# Patient Record
Sex: MALE | Race: WHITE | Employment: PART TIME | URBAN - METROPOLITAN AREA
[De-identification: names, ages, dates, MRNs, and addresses within clinical notes are randomized per-mention and may not be internally consistent; named-entity substitution may affect disease eponyms.]

---

## 2017-03-15 ENCOUNTER — APPOINTMENT (OUTPATIENT)
Dept: LAB | Age: 47
End: 2017-03-15
Attending: INTERNAL MEDICINE
Payer: COMMERCIAL

## 2017-03-15 DIAGNOSIS — R73.03 PREDIABETES: ICD-10-CM

## 2017-03-15 LAB — HBA1C MFR BLD: 6.5 % (ref 4–6)

## 2017-03-15 PROCEDURE — 83036 HEMOGLOBIN GLYCOSYLATED A1C: CPT

## 2017-03-15 PROCEDURE — 36415 COLL VENOUS BLD VENIPUNCTURE: CPT

## 2017-04-29 ENCOUNTER — OFFICE VISIT (OUTPATIENT)
Dept: INTERNAL MEDICINE CLINIC | Facility: CLINIC | Age: 47
End: 2017-04-29

## 2017-04-29 VITALS
SYSTOLIC BLOOD PRESSURE: 154 MMHG | HEART RATE: 78 BPM | RESPIRATION RATE: 18 BRPM | WEIGHT: 228.38 LBS | DIASTOLIC BLOOD PRESSURE: 84 MMHG | HEIGHT: 68 IN | BODY MASS INDEX: 34.61 KG/M2

## 2017-04-29 DIAGNOSIS — Z13.6 ISCHEMIC HEART DISEASE SCREEN: ICD-10-CM

## 2017-04-29 DIAGNOSIS — F98.8 ADD (ATTENTION DEFICIT DISORDER): ICD-10-CM

## 2017-04-29 DIAGNOSIS — R07.2 PRECORDIAL PAIN: Primary | ICD-10-CM

## 2017-04-29 DIAGNOSIS — R03.0 ELEVATED BLOOD PRESSURE READING: ICD-10-CM

## 2017-04-29 DIAGNOSIS — R73.03 PREDIABETES: ICD-10-CM

## 2017-04-29 PROCEDURE — 99212 OFFICE O/P EST SF 10 MIN: CPT | Performed by: INTERNAL MEDICINE

## 2017-04-29 PROCEDURE — 99214 OFFICE O/P EST MOD 30 MIN: CPT | Performed by: INTERNAL MEDICINE

## 2017-04-29 RX ORDER — METFORMIN HYDROCHLORIDE 500 MG/1
500 TABLET, EXTENDED RELEASE ORAL
Qty: 30 TABLET | Refills: 5 | Status: SHIPPED | OUTPATIENT
Start: 2017-04-29 | End: 2017-12-19

## 2017-04-29 RX ORDER — DEXMETHYLPHENIDATE HYDROCHLORIDE 30 MG/1
CAPSULE, EXTENDED RELEASE ORAL
Refills: 0 | COMMUNITY
Start: 2017-04-18

## 2017-04-29 NOTE — PROGRESS NOTES
HPI:    Patient ID: Gustavo Delgado is a 56year old male. HPI Comments: Pt is taking Focalin for ADD. His weight is up, his blood pressure is up and his blood sugar is up. He had chest pain. It lasted 2 minutes.       Blood Sugar  This is a chronic p TREADMILL STRESS, ADULT (CPT=93017); Future    2. Prediabetes  Reviewed recent labs with pt. Discussed with pt the diagnosis of prediabetes. Discussed possible progression to diabetes with accompanying complications.   Advised pt importance of weight loss

## 2017-05-22 ENCOUNTER — HOSPITAL ENCOUNTER (OUTPATIENT)
Dept: CV DIAGNOSTICS | Facility: HOSPITAL | Age: 47
Discharge: HOME | End: 2017-05-22
Attending: INTERNAL MEDICINE
Payer: COMMERCIAL

## 2017-05-22 DIAGNOSIS — R07.2 PRECORDIAL PAIN: ICD-10-CM

## 2017-05-22 PROCEDURE — 93018 CV STRESS TEST I&R ONLY: CPT | Performed by: INTERNAL MEDICINE

## 2017-05-22 PROCEDURE — 93017 CV STRESS TEST TRACING ONLY: CPT | Performed by: INTERNAL MEDICINE

## 2017-05-22 PROCEDURE — 93016 CV STRESS TEST SUPVJ ONLY: CPT | Performed by: INTERNAL MEDICINE

## 2017-12-12 ENCOUNTER — HOSPITAL ENCOUNTER (EMERGENCY)
Facility: HOSPITAL | Age: 47
Discharge: HOME | End: 2017-12-12
Attending: EMERGENCY MEDICINE
Payer: COMMERCIAL

## 2017-12-12 ENCOUNTER — HOSPITAL ENCOUNTER (OUTPATIENT)
Age: 47
Discharge: ANOTHER HOSPITAL | End: 2017-12-12
Attending: FAMILY MEDICINE
Payer: COMMERCIAL

## 2017-12-12 ENCOUNTER — APPOINTMENT (OUTPATIENT)
Dept: CT IMAGING | Facility: HOSPITAL | Age: 47
End: 2017-12-12
Attending: EMERGENCY MEDICINE
Payer: COMMERCIAL

## 2017-12-12 VITALS
SYSTOLIC BLOOD PRESSURE: 151 MMHG | HEART RATE: 68 BPM | RESPIRATION RATE: 16 BRPM | BODY MASS INDEX: 33.65 KG/M2 | WEIGHT: 222 LBS | OXYGEN SATURATION: 98 % | TEMPERATURE: 98 F | HEIGHT: 68 IN | DIASTOLIC BLOOD PRESSURE: 91 MMHG

## 2017-12-12 VITALS
DIASTOLIC BLOOD PRESSURE: 107 MMHG | TEMPERATURE: 98 F | RESPIRATION RATE: 16 BRPM | HEART RATE: 83 BPM | OXYGEN SATURATION: 100 % | SYSTOLIC BLOOD PRESSURE: 166 MMHG

## 2017-12-12 DIAGNOSIS — J01.90 ACUTE SINUSITIS, RECURRENCE NOT SPECIFIED, UNSPECIFIED LOCATION: Primary | ICD-10-CM

## 2017-12-12 DIAGNOSIS — R42 DIZZY: ICD-10-CM

## 2017-12-12 DIAGNOSIS — R10.9 FLANK PAIN: ICD-10-CM

## 2017-12-12 DIAGNOSIS — I10 HYPERTENSION, UNSPECIFIED TYPE: Primary | ICD-10-CM

## 2017-12-12 PROCEDURE — 81003 URINALYSIS AUTO W/O SCOPE: CPT | Performed by: EMERGENCY MEDICINE

## 2017-12-12 PROCEDURE — 83835 ASSAY OF METANEPHRINES: CPT | Performed by: EMERGENCY MEDICINE

## 2017-12-12 PROCEDURE — 99214 OFFICE O/P EST MOD 30 MIN: CPT

## 2017-12-12 PROCEDURE — 70450 CT HEAD/BRAIN W/O DYE: CPT | Performed by: EMERGENCY MEDICINE

## 2017-12-12 PROCEDURE — 82384 ASSAY THREE CATECHOLAMINES: CPT | Performed by: EMERGENCY MEDICINE

## 2017-12-12 PROCEDURE — 84443 ASSAY THYROID STIM HORMONE: CPT | Performed by: EMERGENCY MEDICINE

## 2017-12-12 PROCEDURE — 84484 ASSAY OF TROPONIN QUANT: CPT | Performed by: EMERGENCY MEDICINE

## 2017-12-12 PROCEDURE — 80048 BASIC METABOLIC PNL TOTAL CA: CPT | Performed by: EMERGENCY MEDICINE

## 2017-12-12 PROCEDURE — 93005 ELECTROCARDIOGRAM TRACING: CPT

## 2017-12-12 PROCEDURE — 85025 COMPLETE CBC W/AUTO DIFF WBC: CPT | Performed by: EMERGENCY MEDICINE

## 2017-12-12 PROCEDURE — 99285 EMERGENCY DEPT VISIT HI MDM: CPT

## 2017-12-12 PROCEDURE — 36415 COLL VENOUS BLD VENIPUNCTURE: CPT

## 2017-12-12 PROCEDURE — 93010 ELECTROCARDIOGRAM REPORT: CPT | Performed by: FAMILY MEDICINE

## 2017-12-12 RX ORDER — AMOXICILLIN AND CLAVULANATE POTASSIUM 875; 125 MG/1; MG/1
1 TABLET, FILM COATED ORAL 2 TIMES DAILY
Qty: 20 TABLET | Refills: 0 | Status: SHIPPED | OUTPATIENT
Start: 2017-12-12 | End: 2017-12-22

## 2017-12-12 RX ORDER — BUSPIRONE HYDROCHLORIDE 30 MG/1
45 TABLET ORAL 2 TIMES DAILY
Refills: 0 | COMMUNITY
Start: 2017-12-06

## 2017-12-12 NOTE — ED PROVIDER NOTES
Patient Seen in: [address]    History   Patient presents with:  Hypertension (cardiovascular)    Stated Complaint: High Blood pressure, dizzy    HPI  69-year-old male with PMHx significant for DM2 presents with elevated Temp 98.4 °F (36.9 °C) (Oral)   Resp 16   SpO2 100%         Physical Exam   Constitutional: He is oriented to person, place, and time. He appears well-developed and well-nourished. No distress.    Eyes: Conjunctivae and EOM are normal. Pupils are equal, r

## 2017-12-12 NOTE — ED INITIAL ASSESSMENT (HPI)
Pt here with complaints of high blood pressure and headaches for the past 6 days, pt states his blood pressure consistently 170/80 180/80's pt states he has been dizzy as well  He said he has never felt like this before, pt also states he has had some disc

## 2017-12-13 NOTE — ED PROVIDER NOTES
Patient Seen in: [facility] Emergency Department    History   Patient presents with:  Hypertension (cardiovascular)    Stated Complaint: dizziness/Hypertension      HPI    55 yo M with PMH ADHD, anxiety presenting for evaluation of one week of inter systems are as noted in HPI. Constitutional and vital signs reviewed. All other systems reviewed and negative except as noted above. PSFH elements reviewed from today and agreed except as otherwise stated in HPI.     Physical Exam   ED Triage Vital ------                     CBC W/ DIFFERENTIAL[[id number]]          Abnormal            Final result                 Please view results for these tests on the individual orders.    METANEPHRINES, FRAC., PL. FREE   METANEPHRINES, URINE, TOTAL   CATECHO maxillary and sphenoid sinus infection. This may present as acute headache. Correlate clinically. 2. Otherwise, negative noncontrast CT brain study.          MDM     DIFFERENTIAL DIAGNOSIS: After history and physical exam differential diagnosis was consider

## 2017-12-13 NOTE — ED INITIAL ASSESSMENT (HPI)
Pt states hypertension since 5 days with \"kidney pressure\" - states his pressure has been 170's over 90's. + lightheadedness +HA x 5 days intermittently, worsening today. Sent from [address] [place] [address].

## 2017-12-15 ENCOUNTER — TELEPHONE (OUTPATIENT)
Dept: INTERNAL MEDICINE CLINIC | Facility: CLINIC | Age: 47
End: 2017-12-15

## 2017-12-15 NOTE — TELEPHONE ENCOUNTER
Pt was in the ER 12/12 and needs follow up appt. First available is not until later in January. Can we double book with another appointment to get the patient in?     Please reply to pool: JARRELL Singleton

## 2017-12-15 NOTE — TELEPHONE ENCOUNTER
I can see him Monday at [place] at 3 pm or Tuesday at [address] [facility], [address] at 8:50 am.  If those don't work for him, he can see the ROD Dubon or one of the other doctors.

## 2017-12-19 ENCOUNTER — APPOINTMENT (OUTPATIENT)
Dept: LAB | Facility: HOSPITAL | Age: 47
End: 2017-12-19
Attending: INTERNAL MEDICINE
Payer: COMMERCIAL

## 2017-12-19 DIAGNOSIS — R73.03 PREDIABETES: ICD-10-CM

## 2017-12-19 PROBLEM — R03.0 TRANSIENT ELEVATED BLOOD PRESSURE: Status: ACTIVE | Noted: 2017-12-19

## 2017-12-19 PROCEDURE — 83036 HEMOGLOBIN GLYCOSYLATED A1C: CPT

## 2017-12-19 PROCEDURE — 36415 COLL VENOUS BLD VENIPUNCTURE: CPT

## 2017-12-19 NOTE — PROGRESS NOTES
HPI:    Patient ID: Satinder Gómez is a 56year old male. Pt started on Buspar a month ago or so and had side effects. Then he had lightheadedness and dizziness, nausea. His bp at home was 160/90 at home every afternoon.   They did urinary metanephrin Smokeless tobacco: Never Used                      Alcohol use:  No               Comment: socially     Family History   Problem Relation Age of Onset   • Diabetes Father    • Diabetes Mother    • Hypertension Mother    • Heart Disorder Mother    • Carroll Velasco Metoprolol Succinate ER 50 MG Oral Tablet 24 Hr    Obstructive sleep apnea on CPAP     Controlled. Continue present management. ADD (attention deficit disorder) - Primary     As above. Advised pt to discuss with Dr. Sonia Miller.          Prediabet

## 2017-12-19 NOTE — ASSESSMENT & PLAN NOTE
Patient is on an amphetamine for his ADD. We have had a long discussion today and in the past about the risks of taking this with his multiple cardiovascular risk factors, including prediabetes, ORALIA, obesity, and now elevated blood pressure.   Today we dis

## 2017-12-21 ENCOUNTER — TELEPHONE (OUTPATIENT)
Dept: INTERNAL MEDICINE CLINIC | Facility: CLINIC | Age: 47
End: 2017-12-21

## 2017-12-21 NOTE — TELEPHONE ENCOUNTER
Called pt. Left detailed message. I would like for him to sign a IVAN so I can talk to Dr. Marielle Jeffrey regarding his medication.

## 2018-01-16 PROBLEM — K63.5 POLYP OF COLON: Status: ACTIVE | Noted: 2018-01-16

## 2018-01-17 ENCOUNTER — OFFICE VISIT (OUTPATIENT)
Dept: FAMILY MEDICINE CLINIC | Facility: CLINIC | Age: 48
End: 2018-01-17

## 2018-01-17 VITALS
DIASTOLIC BLOOD PRESSURE: 80 MMHG | HEART RATE: 68 BPM | SYSTOLIC BLOOD PRESSURE: 128 MMHG | WEIGHT: 234 LBS | BODY MASS INDEX: 35.46 KG/M2 | HEIGHT: 68 IN | OXYGEN SATURATION: 98 %

## 2018-01-17 DIAGNOSIS — I10 HYPERTENSION WITH GOAL BLOOD PRESSURE LESS THAN 140/90: Primary | ICD-10-CM

## 2018-01-17 DIAGNOSIS — F98.8 ATTENTION DEFICIT DISORDER, UNSPECIFIED HYPERACTIVITY PRESENCE: ICD-10-CM

## 2018-01-17 DIAGNOSIS — Z23 NEED FOR VACCINATION: ICD-10-CM

## 2018-01-17 DIAGNOSIS — R73.03 PREDIABETES: ICD-10-CM

## 2018-01-17 DIAGNOSIS — R79.89 ELEVATED PLASMA METANEPHRINES: ICD-10-CM

## 2018-01-17 PROBLEM — R03.0 TRANSIENT ELEVATED BLOOD PRESSURE: Status: RESOLVED | Noted: 2017-12-19 | Resolved: 2018-01-17

## 2018-01-17 PROCEDURE — 90471 IMMUNIZATION ADMIN: CPT | Performed by: FAMILY MEDICINE

## 2018-01-17 PROCEDURE — 90686 IIV4 VACC NO PRSV 0.5 ML IM: CPT | Performed by: FAMILY MEDICINE

## 2018-01-17 PROCEDURE — 99203 OFFICE O/P NEW LOW 30 MIN: CPT | Performed by: FAMILY MEDICINE

## 2018-01-17 RX ORDER — METFORMIN HYDROCHLORIDE 500 MG/1
500 TABLET, EXTENDED RELEASE ORAL 2 TIMES DAILY WITH MEALS
Qty: 30 TABLET | Refills: 0 | COMMUNITY
Start: 2018-01-17 | End: 2018-03-08

## 2018-01-17 NOTE — PROGRESS NOTES
CC:  Patient presents with:  Establish Care      HPI: 56year old male with history of pre-diabetes, ADHD, recent HTN, and ORALIA here to establish care. Previous PCP was Dr. Seda Zhou, saw her for a few years.   Went to ED in December with dizziness and high b ADHD and anxiety     Past Medical History:   Diagnosis Date   • ADHD (attention deficit hyperactivity disorder)    • Borderline diabetes    • Borderline hypertension    • Hemorrhoid    • Sleep apnea     Wears CPAP         Social History  Social History   M continues to take Focalin and reports no significant changes to BP when he is off medication  - Plan to repeat 24 hour urinary metanephrine testing after he has been off Focalin for 72 hours to determine if the test was falsely impacted by medication   - A

## 2018-01-27 ENCOUNTER — APPOINTMENT (OUTPATIENT)
Dept: LAB | Facility: REFERENCE LAB | Age: 48
End: 2018-01-27
Attending: FAMILY MEDICINE
Payer: COMMERCIAL

## 2018-01-27 DIAGNOSIS — R79.89 ELEVATED PLASMA METANEPHRINES: ICD-10-CM

## 2018-01-27 PROCEDURE — 83835 ASSAY OF METANEPHRINES: CPT

## 2018-01-31 LAB
CREATININE, URINE - PER 24H: 1138 MG/D
CREATININE, URINE - PER VOLUME: 99 MG/DL
HOURS COLLECTED: 24 HR
METANEPHRINE, UR- RATIO TO CRT: 53 UG/G CRT
METANEPHRINE, URINE - PER 24H: 60 UG/D
METANEPHRINE, URINE-PER VOLUME: 52 UG/L
NORMETANEPHRINE, UR-PER VOLUME: 218 UG/L
NORMETANEPHRINE, URINE - RATIO: 220 UG/G CRT
NORMETANEPHRINE, URINE-PER 24H: 251 UG/D
TOTAL VOLUME: 1150 ML

## 2018-02-15 ENCOUNTER — MED REC SCAN ONLY (OUTPATIENT)
Dept: INTERNAL MEDICINE CLINIC | Facility: CLINIC | Age: 48
End: 2018-02-15

## 2018-03-08 ENCOUNTER — APPOINTMENT (OUTPATIENT)
Dept: LAB | Facility: REFERENCE LAB | Age: 48
End: 2018-03-08
Attending: FAMILY MEDICINE
Payer: COMMERCIAL

## 2018-03-08 ENCOUNTER — OFFICE VISIT (OUTPATIENT)
Dept: FAMILY MEDICINE CLINIC | Facility: CLINIC | Age: 48
End: 2018-03-08

## 2018-03-08 VITALS
DIASTOLIC BLOOD PRESSURE: 76 MMHG | HEART RATE: 72 BPM | SYSTOLIC BLOOD PRESSURE: 124 MMHG | WEIGHT: 228 LBS | OXYGEN SATURATION: 98 % | HEIGHT: 68 IN | BODY MASS INDEX: 34.56 KG/M2

## 2018-03-08 DIAGNOSIS — R73.03 PREDIABETES: ICD-10-CM

## 2018-03-08 DIAGNOSIS — I10 HYPERTENSION WITH GOAL BLOOD PRESSURE LESS THAN 140/90: ICD-10-CM

## 2018-03-08 DIAGNOSIS — R39.15 URGENCY OF URINATION: ICD-10-CM

## 2018-03-08 DIAGNOSIS — Z00.01 ENCOUNTER FOR ROUTINE ADULT HEALTH EXAMINATION WITH ABNORMAL FINDINGS: Primary | ICD-10-CM

## 2018-03-08 DIAGNOSIS — K59.01 SLOW TRANSIT CONSTIPATION: ICD-10-CM

## 2018-03-08 PROBLEM — E11.9 TYPE 2 DIABETES MELLITUS WITHOUT COMPLICATION (HCC): Status: ACTIVE | Noted: 2017-12-19

## 2018-03-08 PROBLEM — R79.89 ELEVATED PLASMA METANEPHRINES: Status: RESOLVED | Noted: 2018-01-17 | Resolved: 2018-03-08

## 2018-03-08 LAB
ALBUMIN SERPL BCP-MCNC: 4.2 G/DL (ref 3.5–4.8)
ALBUMIN/GLOB SERPL: 1.2 {RATIO} (ref 1–2)
ALP SERPL-CCNC: 54 U/L (ref 32–100)
ALT SERPL-CCNC: 23 U/L (ref 17–63)
ANION GAP SERPL CALC-SCNC: 7 MMOL/L (ref 0–18)
AST SERPL-CCNC: 22 U/L (ref 15–41)
BILIRUB SERPL-MCNC: 0.5 MG/DL (ref 0.3–1.2)
BILIRUBIN URINE: NEGATIVE
BLOOD URINE: NEGATIVE
BUN SERPL-MCNC: 14 MG/DL (ref 8–20)
BUN/CREAT SERPL: 13.1 (ref 10–20)
CALCIUM SERPL-MCNC: 9.6 MG/DL (ref 8.5–10.5)
CHLORIDE SERPL-SCNC: 105 MMOL/L (ref 95–110)
CHOLEST SERPL-MCNC: 182 MG/DL (ref 110–200)
CO2 SERPL-SCNC: 28 MMOL/L (ref 22–32)
CONTROL RUN WITHIN 24 HOURS?: YES
CREAT SERPL-MCNC: 1.07 MG/DL (ref 0.5–1.5)
GLOBULIN PLAS-MCNC: 3.4 G/DL (ref 2.5–3.7)
GLUCOSE SERPL-MCNC: 115 MG/DL (ref 70–99)
GLUCOSE URINE: NEGATIVE
HBA1C MFR BLD: 7 % (ref 4–6)
HDLC SERPL-MCNC: 37 MG/DL
KETONE URINE: NEGATIVE
LDLC SERPL CALC-MCNC: 124 MG/DL (ref 0–99)
LEUKOCYTE ESTERASE URINE: NEGATIVE
NITRITE URINE: NEGATIVE
NONHDLC SERPL-MCNC: 145 MG/DL
OSMOLALITY UR CALC.SUM OF ELEC: 291 MOSM/KG (ref 275–295)
PATIENT FASTING: YES
PH URINE: 5.5 (ref 5–8)
POTASSIUM SERPL-SCNC: 5.1 MMOL/L (ref 3.3–5.1)
PROT SERPL-MCNC: 7.6 G/DL (ref 5.9–8.4)
PROTEIN URINE: NEGATIVE
SODIUM SERPL-SCNC: 140 MMOL/L (ref 136–144)
SPEC GRAVITY: 1.02 (ref 1–1.03)
TRIGL SERPL-MCNC: 104 MG/DL (ref 1–149)
URINE CLARITY: CLEAR
URINE COLOR: YELLOW
UROBILINOGEN URINE: 0.2

## 2018-03-08 PROCEDURE — 99396 PREV VISIT EST AGE 40-64: CPT | Performed by: FAMILY MEDICINE

## 2018-03-08 PROCEDURE — 36415 COLL VENOUS BLD VENIPUNCTURE: CPT

## 2018-03-08 PROCEDURE — 80061 LIPID PANEL: CPT

## 2018-03-08 PROCEDURE — 83036 HEMOGLOBIN GLYCOSYLATED A1C: CPT

## 2018-03-08 PROCEDURE — 80053 COMPREHEN METABOLIC PANEL: CPT

## 2018-03-08 RX ORDER — METFORMIN HYDROCHLORIDE 500 MG/1
1000 TABLET, EXTENDED RELEASE ORAL 2 TIMES DAILY WITH MEALS
Qty: 270 TABLET | Refills: 0 | COMMUNITY
Start: 2018-03-08 | End: 2018-06-11

## 2018-03-08 NOTE — PATIENT INSTRUCTIONS
Treating Constipation    Constipation is a common and often uncomfortable problem. Constipation means you have bowel movements fewer than 3 times per week, or strain to pass hard, dry stool. It can last a short time.  Or it can be a problem that never see © 2758-8274 [address]. [address] [facility], [facility] [place]. All rights reserved. This information is not intended as a substitute for professional medical care. Always follow your healthcare professional's instructions.         Prevent Vision All men in this age group Every 2 to 4 years if no risk factors for eye disease2   Vaccine Who needs it How often   Chickenpox (varicella) All men in this age group who have no record of this infection or vaccine 2 doses; the second dose should be g Use of daily aspirin Men ages 39 to 78 at risk for cardiovascular health problems At routine exams   Use of tobacco and the health effects it can cause All men in this age group Every exam   [facility]

## 2018-03-08 NOTE — PROGRESS NOTES
Magy Key is a 56year old male who presents for a complete physical exam.   HPI:     Concerns: Getting up in the night to urinate once a night for the past 4 weeks, only happens 3-4 times a week. No new medication changes.  Has had symptoms of urge 30 MG Oral Tab Take 30 mg by mouth 2 (two) times daily.    Disp:  Rfl: 0   Dexmethylphenidate HCl ER 30 MG Oral Capsule SR 24 Hr TAKE ONE CAPSULE BY MOUTH IN THE MORNING Disp:  Rfl: 0      Past Medical History:   Diagnosis Date   • ADHD (attention deficit h hour(s))  -HEMOGLOBIN A1C   Collection Time: 03/08/18  9:44 AM   Result Value Ref Range   Glycohemoglobin (HgA1c) 7.0 (H) 4.0 - 6.0 %   -LIPID PANEL   Collection Time: 03/08/18  9:44 AM   Result Value Ref Range   HDL Cholesterol 37 mg/dL   Cholesterol, Tot pressure less than 140/90    Orders Placed This Encounter      POCT urinalysis dipstick     Increased urgency possible related to constipation or Type 2 DM. Will check A1C and CMP today with routine labs. Urinalysis negative.  Plan to check PSA at age 49 an

## 2018-04-25 ENCOUNTER — TELEPHONE (OUTPATIENT)
Dept: FAMILY MEDICINE CLINIC | Facility: CLINIC | Age: 48
End: 2018-04-25

## 2018-06-06 DIAGNOSIS — R73.03 PREDIABETES: ICD-10-CM

## 2018-06-06 RX ORDER — METFORMIN HYDROCHLORIDE 500 MG/1
TABLET, EXTENDED RELEASE ORAL
Qty: 90 TABLET | Refills: 3 | OUTPATIENT
Start: 2018-06-06

## 2018-06-06 NOTE — TELEPHONE ENCOUNTER
LMTCB transfer to triage--ask patient if Dr Geri Nicholson will be PCP. [employer] message sent. Patient has seen Dr Xi Basilio twice this year. Metformin refill declined.

## 2018-06-11 DIAGNOSIS — E11.9 TYPE 2 DIABETES MELLITUS WITHOUT COMPLICATION, WITHOUT LONG-TERM CURRENT USE OF INSULIN (HCC): Primary | ICD-10-CM

## 2018-06-11 RX ORDER — METFORMIN HYDROCHLORIDE 500 MG/1
1000 TABLET, EXTENDED RELEASE ORAL 2 TIMES DAILY WITH MEALS
Qty: 270 TABLET | Refills: 1 | Status: SHIPPED
Start: 2018-06-11 | End: 2018-10-19

## 2018-06-11 NOTE — TELEPHONE ENCOUNTER
From: Antionette Mercado  Sent: 6/11/2018 11:18 AM CDT  Subject: Medication Renewal Request    Vinita Susanna would like a refill of the following medications:     MetFORMIN HCl  MG Oral Tablet 24 Hr   Patient Comment: [facility]ely [facility] not

## 2018-06-14 DIAGNOSIS — R03.0 TRANSIENT ELEVATED BLOOD PRESSURE: ICD-10-CM

## 2018-06-15 RX ORDER — METOPROLOL SUCCINATE 50 MG/1
50 TABLET, EXTENDED RELEASE ORAL DAILY
Qty: 30 TABLET | Refills: 2 | Status: SHIPPED | OUTPATIENT
Start: 2018-06-15 | End: 2019-02-26

## 2018-06-15 NOTE — TELEPHONE ENCOUNTER
Hypertensive Medications  Protocol Criteria:  · Appointment scheduled in the past 6 months or in the next 3 months  · BMP or CMP in the past 12 months  · Creatinine result < 2  Recent Outpatient Visits            3 months ago Encounter for routine adult he

## 2018-06-19 ENCOUNTER — OFFICE VISIT (OUTPATIENT)
Dept: FAMILY MEDICINE CLINIC | Facility: CLINIC | Age: 48
End: 2018-06-19

## 2018-06-19 ENCOUNTER — APPOINTMENT (OUTPATIENT)
Dept: LAB | Facility: REFERENCE LAB | Age: 48
End: 2018-06-19
Attending: FAMILY MEDICINE
Payer: COMMERCIAL

## 2018-06-19 VITALS
WEIGHT: 229 LBS | SYSTOLIC BLOOD PRESSURE: 128 MMHG | BODY MASS INDEX: 35 KG/M2 | HEART RATE: 74 BPM | OXYGEN SATURATION: 98 % | DIASTOLIC BLOOD PRESSURE: 74 MMHG

## 2018-06-19 DIAGNOSIS — E11.9 TYPE 2 DIABETES MELLITUS WITHOUT COMPLICATION, WITHOUT LONG-TERM CURRENT USE OF INSULIN (HCC): ICD-10-CM

## 2018-06-19 DIAGNOSIS — E11.9 TYPE 2 DIABETES MELLITUS WITHOUT COMPLICATION, WITHOUT LONG-TERM CURRENT USE OF INSULIN (HCC): Primary | ICD-10-CM

## 2018-06-19 PROCEDURE — 83036 HEMOGLOBIN GLYCOSYLATED A1C: CPT

## 2018-06-19 PROCEDURE — 99214 OFFICE O/P EST MOD 30 MIN: CPT | Performed by: FAMILY MEDICINE

## 2018-06-19 PROCEDURE — 82043 UR ALBUMIN QUANTITATIVE: CPT

## 2018-06-19 PROCEDURE — 36415 COLL VENOUS BLD VENIPUNCTURE: CPT

## 2018-06-19 PROCEDURE — 82570 ASSAY OF URINE CREATININE: CPT

## 2018-06-19 NOTE — PROGRESS NOTES
HPI:   Eliecer Ortiz is a 53year old male who presents for recheck of his diabetes.   DM dx at age:  56  Current medications:     Current Outpatient Prescriptions:  METOPROLOL SUCCINATE ER 50 MG Oral Tablet 24 Hr TAKE 1 TABLET (50 MG TOTAL) BY MOUTH DA midnight. Wakes up feeling hungry. Around 7-8 am has two pieces of toast, bowl of cereal, and 1-2 eggs.  Does not eat lunch but will drink water, then eats again around 4pm and makes a meal and eats dinner around 5pm with a snack later in the day and eats u discuss need for pneumovax at next visit. Return in 3 months for follow-up or sooner as needed. The patient indicates understanding of these issues and agrees to the plan. The patient is asked to return in 3 months.   No orders of the defined types we

## 2018-10-19 RX ORDER — METFORMIN HYDROCHLORIDE 500 MG/1
1000 TABLET, EXTENDED RELEASE ORAL 2 TIMES DAILY WITH MEALS
Qty: 270 TABLET | Refills: 1 | Status: SHIPPED | OUTPATIENT
Start: 2018-10-19 | End: 2019-04-08

## 2019-02-26 ENCOUNTER — OFFICE VISIT (OUTPATIENT)
Dept: FAMILY MEDICINE CLINIC | Facility: CLINIC | Age: 49
End: 2019-02-26
Payer: COMMERCIAL

## 2019-02-26 VITALS
BODY MASS INDEX: 34.86 KG/M2 | SYSTOLIC BLOOD PRESSURE: 114 MMHG | DIASTOLIC BLOOD PRESSURE: 68 MMHG | HEART RATE: 72 BPM | HEIGHT: 68 IN | OXYGEN SATURATION: 99 % | WEIGHT: 230 LBS

## 2019-02-26 DIAGNOSIS — Z00.01 ENCOUNTER FOR ROUTINE ADULT HEALTH EXAMINATION WITH ABNORMAL FINDINGS: Primary | ICD-10-CM

## 2019-02-26 DIAGNOSIS — E11.9 TYPE 2 DIABETES MELLITUS WITHOUT COMPLICATION, WITHOUT LONG-TERM CURRENT USE OF INSULIN (HCC): ICD-10-CM

## 2019-02-26 DIAGNOSIS — I10 HYPERTENSION WITH GOAL BLOOD PRESSURE LESS THAN 140/90: ICD-10-CM

## 2019-02-26 PROCEDURE — 99396 PREV VISIT EST AGE 40-64: CPT | Performed by: FAMILY MEDICINE

## 2019-02-26 RX ORDER — METOPROLOL SUCCINATE 50 MG/1
50 TABLET, EXTENDED RELEASE ORAL DAILY
Qty: 90 TABLET | Refills: 1 | Status: SHIPPED | OUTPATIENT
Start: 2019-02-26 | End: 2019-11-06

## 2019-02-26 NOTE — PROGRESS NOTES
Nicolette Diane is a 53year old male who presents for a complete physical exam.   HPI:     Concerns: working on changing habits with his nutrition and trying to get his family on board. Diet/exercise:  Will have whole wheat toast with butter and cream deficit hyperactivity disorder)    • Borderline diabetes    • Borderline hypertension    • Hemorrhoid    • Sleep apnea     Wears CPAP      History reviewed. No pertinent surgical history.    Family History   Problem Relation Age of Onset   • Diabetes Father 09/24/2016 22     ALT (U/L)   Date Value   03/08/2018 23   09/24/2016 23      ASSESSMENT AND PLAN:   Eliecer Ortiz is a 53year old male who presents for a complete physical exam.    Encounter for routine adult health examination with abnormal findin mouth daily. Imaging & Consults:  None    Return in 1 year for annual physical or sooner as needed.      Rosetta Pozo,   02/26/19   9:07 AM

## 2019-02-26 NOTE — PATIENT INSTRUCTIONS
-Check with insurance on cost of PSA screening  -Schedule heart scan   -Return for fasting blood work (coffee without cream or sugar, medication, and water okay but nothing else for 8-10 hours prior) Monday through Saturday starting at 7:30 am      Prevent routine exams   Prostate cancer Starting at age 39, talk to healthcare provider about risks and benefits of digital rectal exam (PIEDAD) and prostate-specific antigen (PSA) screening1 At routine exams   Syphilis Men at increased risk for infection – talk with booster All men in this age group Td every 10 years, or a one-time dose of Tdap instead of a Td booster after age 25, then Td every 10 years   Counseling Who needs it How often   Diet and exercise Men who are overweight or obese When diagnosed, and then at

## 2019-03-28 ENCOUNTER — APPOINTMENT (OUTPATIENT)
Dept: LAB | Facility: REFERENCE LAB | Age: 49
End: 2019-03-28
Attending: FAMILY MEDICINE
Payer: COMMERCIAL

## 2019-03-28 DIAGNOSIS — Z00.01 ENCOUNTER FOR ROUTINE ADULT HEALTH EXAMINATION WITH ABNORMAL FINDINGS: ICD-10-CM

## 2019-03-28 LAB
EST. AVERAGE GLUCOSE BLD GHB EST-MCNC: 163 MG/DL (ref 68–126)
HBA1C MFR BLD HPLC: 7.3 % (ref ?–5.7)

## 2019-03-28 PROCEDURE — 36415 COLL VENOUS BLD VENIPUNCTURE: CPT | Performed by: FAMILY MEDICINE

## 2019-03-28 PROCEDURE — 83036 HEMOGLOBIN GLYCOSYLATED A1C: CPT | Performed by: FAMILY MEDICINE

## 2019-04-08 RX ORDER — METFORMIN HYDROCHLORIDE 500 MG/1
1000 TABLET, EXTENDED RELEASE ORAL 2 TIMES DAILY WITH MEALS
Qty: 270 TABLET | Refills: 0 | Status: SHIPPED | OUTPATIENT
Start: 2019-04-08 | End: 2019-06-12

## 2019-06-12 RX ORDER — METFORMIN HYDROCHLORIDE 500 MG/1
1000 TABLET, EXTENDED RELEASE ORAL 2 TIMES DAILY WITH MEALS
Qty: 270 TABLET | Refills: 0 | Status: SHIPPED | OUTPATIENT
Start: 2019-06-12 | End: 2020-01-25

## 2019-11-06 RX ORDER — METOPROLOL SUCCINATE 50 MG/1
TABLET, EXTENDED RELEASE ORAL
Qty: 90 TABLET | Refills: 1 | Status: SHIPPED | OUTPATIENT
Start: 2019-11-06 | End: 2020-03-30

## 2019-11-06 NOTE — TELEPHONE ENCOUNTER
A refill request was received for:  Requested Prescriptions     Pending Prescriptions Disp Refills   • METOPROLOL SUCCINATE ER 50 MG Oral Tablet 24 Hr [Pharmacy Med Name: METOPROLOL SUCC ER 50 MG TAB] 90 tablet 1     Sig: TAKE 1 TABLET BY MOUTH EVERY DAY

## 2020-01-27 RX ORDER — METFORMIN HYDROCHLORIDE 500 MG/1
1000 TABLET, EXTENDED RELEASE ORAL 2 TIMES DAILY WITH MEALS
Qty: 270 TABLET | Refills: 0 | Status: SHIPPED | OUTPATIENT
Start: 2020-01-27 | End: 2020-03-31

## 2020-01-27 NOTE — TELEPHONE ENCOUNTER
A refill request was received for:  Requested Prescriptions     Pending Prescriptions Disp Refills   • metFORMIN HCl  MG Oral Tablet 24 Hr 270 tablet 0     Sig: Take 2 tablets (1,000 mg total) by mouth 2 (two) times daily with meals.      Last refill

## 2020-01-31 ENCOUNTER — OFFICE VISIT (OUTPATIENT)
Dept: FAMILY MEDICINE CLINIC | Facility: CLINIC | Age: 50
End: 2020-01-31
Payer: COMMERCIAL

## 2020-01-31 VITALS
DIASTOLIC BLOOD PRESSURE: 78 MMHG | BODY MASS INDEX: 36.22 KG/M2 | WEIGHT: 239 LBS | OXYGEN SATURATION: 98 % | SYSTOLIC BLOOD PRESSURE: 124 MMHG | HEIGHT: 68 IN | HEART RATE: 77 BPM

## 2020-01-31 DIAGNOSIS — Z00.01 ENCOUNTER FOR ROUTINE ADULT HEALTH EXAMINATION WITH ABNORMAL FINDINGS: Primary | ICD-10-CM

## 2020-01-31 DIAGNOSIS — E11.9 TYPE 2 DIABETES MELLITUS WITHOUT COMPLICATION, WITHOUT LONG-TERM CURRENT USE OF INSULIN (HCC): ICD-10-CM

## 2020-01-31 DIAGNOSIS — I10 HYPERTENSION WITH GOAL BLOOD PRESSURE LESS THAN 140/90: ICD-10-CM

## 2020-01-31 PROCEDURE — 99396 PREV VISIT EST AGE 40-64: CPT | Performed by: FAMILY MEDICINE

## 2020-01-31 RX ORDER — MULTIVIT-MIN/IRON FUM/FOLIC AC 7.5 MG-4
1 TABLET ORAL DAILY
COMMUNITY

## 2020-01-31 NOTE — PROGRESS NOTES
Delmy Cash is a 51year old male who presents for a complete physical exam.   HPI:     Concerns: Has had financial hardships and has not been taking care of his health. Has been under stress and having more anxiety as well.  Still seeing his psychiat (two) times daily with meals. 270 tablet 0   • METOPROLOL SUCCINATE ER 50 MG Oral Tablet 24 Hr TAKE 1 TABLET BY MOUTH EVERY DAY 90 tablet 1   • BusPIRone HCl 30 MG Oral Tab Take 45 mg by mouth 2 (two) times daily.  Takes 1.5 pills twice a day   0   • Dexmet murmur  GI: good bowel sounds,no masses, HSM or tenderness  : No inguinal hernias or masses   EXTREMITIES: no cyanosis, clubbing or edema  NEURO: Oriented times three, cranial nerves are intact, motor and sensory are grossly intact    Cholesterol, Total regular exercise and weight loss  -Diabetes screening which he desires  -Cholesterol screening which he desires   -Recommendation for yearly influenza vaccine  -Need for Tdap once as an adult and Td booster every 10 years  -Need for Zoster vaccine for mildred

## 2020-01-31 NOTE — PATIENT INSTRUCTIONS
-Find out if your insurance covers the Pneumovax 23 vaccine for your diagnosis of Diabetes and if so, come back for a nurse visit for this   Prevention Guidelines, Men Ages 36 to 52  Screening tests and vaccines are an important part of managing your healt Prostate cancer Starting at age 39, talk to healthcare provider about risks and benefits of digital rectal exam (PIEDAD) and prostate-specific antigen (PSA) screening1 At routine exams   Syphilis Men at increased risk for infection – talk with your healthcare pertussis (Td/Tdap) booster All men in this age group Td every 10 years, or a one-time dose of Tdap instead of a Td booster after age 25, then Td every 10 years   Counseling Who needs it How often   Diet and exercise Men who are overweight or obese When [facility]

## 2020-03-30 RX ORDER — METOPROLOL SUCCINATE 50 MG/1
50 TABLET, EXTENDED RELEASE ORAL
Qty: 90 TABLET | Refills: 1 | Status: SHIPPED | OUTPATIENT
Start: 2020-03-30 | End: 2020-10-03

## 2020-03-31 RX ORDER — METFORMIN HYDROCHLORIDE 500 MG/1
TABLET, EXTENDED RELEASE ORAL
Qty: 270 TABLET | Refills: 1 | Status: SHIPPED | OUTPATIENT
Start: 2020-03-31 | End: 2020-10-03

## 2020-03-31 NOTE — TELEPHONE ENCOUNTER
A refill request was received for:  Requested Prescriptions     Pending Prescriptions Disp Refills   • METFORMIN HCL  MG Oral Tablet 24 Hr [Pharmacy Med Name: metFORMIN HCl  MG Oral Tablet Extended Release 24 Hour] 270 tablet 0     Sig: TAKE 2

## 2020-08-31 ENCOUNTER — PATIENT OUTREACH (OUTPATIENT)
Dept: FAMILY MEDICINE CLINIC | Facility: CLINIC | Age: 50
End: 2020-08-31

## 2020-10-06 RX ORDER — METFORMIN HYDROCHLORIDE 500 MG/1
1000 TABLET, EXTENDED RELEASE ORAL 2 TIMES DAILY WITH MEALS
Qty: 270 TABLET | Refills: 1 | Status: SHIPPED | OUTPATIENT
Start: 2020-10-06 | End: 2021-04-29

## 2020-10-06 RX ORDER — METOPROLOL SUCCINATE 50 MG/1
50 TABLET, EXTENDED RELEASE ORAL
Qty: 90 TABLET | Refills: 1 | Status: SHIPPED | OUTPATIENT
Start: 2020-10-06 | End: 2021-04-29

## 2020-10-06 NOTE — TELEPHONE ENCOUNTER
A refill request was received for:  Requested Prescriptions     Pending Prescriptions Disp Refills   • Metoprolol Succinate ER 50 MG Oral Tablet 24 Hr 90 tablet 1     Sig: Take 1 tablet (50 mg total) by mouth once daily.    • metFORMIN HCl  MG Oral Ta

## 2020-10-15 ENCOUNTER — OFFICE VISIT (OUTPATIENT)
Dept: FAMILY MEDICINE CLINIC | Facility: CLINIC | Age: 50
End: 2020-10-15
Payer: COMMERCIAL

## 2020-10-15 VITALS
HEIGHT: 68 IN | BODY MASS INDEX: 36.83 KG/M2 | HEART RATE: 68 BPM | WEIGHT: 243 LBS | OXYGEN SATURATION: 98 % | SYSTOLIC BLOOD PRESSURE: 128 MMHG | DIASTOLIC BLOOD PRESSURE: 70 MMHG

## 2020-10-15 DIAGNOSIS — E11.65 TYPE 2 DIABETES MELLITUS WITH HYPERGLYCEMIA, WITHOUT LONG-TERM CURRENT USE OF INSULIN (HCC): Primary | ICD-10-CM

## 2020-10-15 DIAGNOSIS — E78.2 MIXED HYPERLIPIDEMIA: ICD-10-CM

## 2020-10-15 PROCEDURE — 3008F BODY MASS INDEX DOCD: CPT | Performed by: FAMILY MEDICINE

## 2020-10-15 PROCEDURE — 3074F SYST BP LT 130 MM HG: CPT | Performed by: FAMILY MEDICINE

## 2020-10-15 PROCEDURE — 3078F DIAST BP <80 MM HG: CPT | Performed by: FAMILY MEDICINE

## 2020-10-15 PROCEDURE — 99214 OFFICE O/P EST MOD 30 MIN: CPT | Performed by: FAMILY MEDICINE

## 2020-10-15 NOTE — PATIENT INSTRUCTIONS
Understanding Carbohydrates    A car needs the right type of fuel to run. And you need the right kind of food to function. To keep your energy level up, your body needs food that has carbohydrates.  But carbs raise blood sugar levels higher and faster dominic Keep track of the amount of carbs you eat. This can help you keep the right balance of carbs, physical activity, and medicine. The amount of carbs you need will be different from what other people need. How much you need depends on many things.  These inclu · Check the serving size. The information on the label is based on that serving size. If you eat more than the listed serving size, you may have to double or triple the other information on the label.   · Check the total grams of carbohydrates.  Total Tawana Cholesterol is a waxy substance. It travels in your blood through the blood vessels. When you have high cholesterol, it can build up along the walls of the blood vessels. This makes the vessels narrower and decreases blood flow.  You are then at greater ris · Eat about two, 3.5 ounce servings of non-fried fish such as salmon, herring, sardines or mackerel per week . Most fish contain omega-3 fatty acids. These help lower total blood cholesterol.  Omega-3 fatty acids lowers triglyceride levels, another form of © 2367-5839 [address]. [address] [facility]. All rights reserved. This information is not intended as a substitute for professional medical care. Always follow your healthcare professional's instructions.

## 2020-10-15 NOTE — PROGRESS NOTES
CC:  Patient presents with:  Diabetes      HPI: 53year old male here to follow-up on poorly controlled Type 2 DM and hyperlipidemia. Has had a lot of stress and is upset about his weight gain.   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     Physical:  General:  Alert, appropriate, no acute distress   HEENT: supple, no tonsillar erythema or exudate, no lymphadenopathy   Cardio:  RRR, no murmurs, S1, S2  Pulmonary:  Clear bilaterally, good air entry  Dermatologic:  No rashes or lesion

## 2021-05-04 RX ORDER — METFORMIN HYDROCHLORIDE 500 MG/1
1000 TABLET, EXTENDED RELEASE ORAL 2 TIMES DAILY WITH MEALS
Qty: 270 TABLET | Refills: 1 | Status: SHIPPED | OUTPATIENT
Start: 2021-05-04 | End: 2021-09-28

## 2021-05-04 RX ORDER — METOPROLOL SUCCINATE 50 MG/1
50 TABLET, EXTENDED RELEASE ORAL
Qty: 90 TABLET | Refills: 1 | Status: SHIPPED | OUTPATIENT
Start: 2021-05-04 | End: 2021-11-06

## 2021-06-11 ENCOUNTER — OFFICE VISIT (OUTPATIENT)
Dept: FAMILY MEDICINE CLINIC | Facility: CLINIC | Age: 51
End: 2021-06-11
Payer: COMMERCIAL

## 2021-06-11 ENCOUNTER — LABORATORY ENCOUNTER (OUTPATIENT)
Dept: LAB | Facility: REFERENCE LAB | Age: 51
End: 2021-06-11
Attending: FAMILY MEDICINE
Payer: COMMERCIAL

## 2021-06-11 VITALS
HEIGHT: 68 IN | HEART RATE: 65 BPM | BODY MASS INDEX: 36.28 KG/M2 | SYSTOLIC BLOOD PRESSURE: 124 MMHG | WEIGHT: 239.38 LBS | OXYGEN SATURATION: 98 % | DIASTOLIC BLOOD PRESSURE: 76 MMHG

## 2021-06-11 DIAGNOSIS — Z12.11 COLON CANCER SCREENING: ICD-10-CM

## 2021-06-11 DIAGNOSIS — Z00.00 ENCOUNTER FOR ROUTINE ADULT HEALTH EXAMINATION WITHOUT ABNORMAL FINDINGS: ICD-10-CM

## 2021-06-11 DIAGNOSIS — E11.65 TYPE 2 DIABETES MELLITUS WITH HYPERGLYCEMIA, WITHOUT LONG-TERM CURRENT USE OF INSULIN (HCC): ICD-10-CM

## 2021-06-11 DIAGNOSIS — Z00.00 ENCOUNTER FOR ROUTINE ADULT HEALTH EXAMINATION WITHOUT ABNORMAL FINDINGS: Primary | ICD-10-CM

## 2021-06-11 PROCEDURE — 80061 LIPID PANEL: CPT

## 2021-06-11 PROCEDURE — 82043 UR ALBUMIN QUANTITATIVE: CPT

## 2021-06-11 PROCEDURE — 3074F SYST BP LT 130 MM HG: CPT | Performed by: FAMILY MEDICINE

## 2021-06-11 PROCEDURE — 86803 HEPATITIS C AB TEST: CPT

## 2021-06-11 PROCEDURE — 3008F BODY MASS INDEX DOCD: CPT | Performed by: FAMILY MEDICINE

## 2021-06-11 PROCEDURE — 85025 COMPLETE CBC W/AUTO DIFF WBC: CPT

## 2021-06-11 PROCEDURE — 3078F DIAST BP <80 MM HG: CPT | Performed by: FAMILY MEDICINE

## 2021-06-11 PROCEDURE — 82570 ASSAY OF URINE CREATININE: CPT

## 2021-06-11 PROCEDURE — 80053 COMPREHEN METABOLIC PANEL: CPT

## 2021-06-11 PROCEDURE — 83036 HEMOGLOBIN GLYCOSYLATED A1C: CPT

## 2021-06-11 PROCEDURE — 36415 COLL VENOUS BLD VENIPUNCTURE: CPT

## 2021-06-11 PROCEDURE — 99396 PREV VISIT EST AGE 40-64: CPT | Performed by: FAMILY MEDICINE

## 2021-06-11 RX ORDER — GUANFACINE 3 MG/1
3 TABLET, EXTENDED RELEASE ORAL NIGHTLY
COMMUNITY
Start: 2021-04-05

## 2021-06-11 NOTE — PROGRESS NOTES
Courtney Kay is a 49year old male who presents for a complete physical exam.   HPI:     Concerns: Has heart scan scheduled 7/19. Sleep patterns have changed due to hours at work and driving his wife and daughter around.  Sleeps 2-3 hour stretches at a daily with meals. 270 tablet 1   • Multiple Vitamins-Minerals (MULTI-VITAMIN/MINERALS) Oral Tab Take 1 tablet by mouth daily. • BusPIRone HCl 30 MG Oral Tab Take 45 mg by mouth 2 (two) times daily.  Takes 1.5 pills twice a day   0   • Dexmethylphenidate PERRLA, EOMI  NECK: supple, no adenopathy   LUNGS: clear to auscultation  CARDIO: RRR without murmur  GI: good bowel sounds,no masses, HSM or tenderness  EXTREMITIES: no cyanosis, clubbing or edema  NEURO: Oriented times three, cranial nerves are intact, m 30 minutes a day most days of the week   -Importance of regular exercise and weight loss  -Recommendation for yearly influenza vaccine  -Need for Tdap once as an adult and Td booster every 10 years  -Need for Zoster vaccine for patients >= 50   -STI screen

## 2021-07-19 ENCOUNTER — HOSPITAL ENCOUNTER (OUTPATIENT)
Dept: CT IMAGING | Facility: HOSPITAL | Age: 51
Discharge: HOME | End: 2021-07-19
Attending: FAMILY MEDICINE

## 2021-07-19 DIAGNOSIS — Z13.6 SCREENING FOR CARDIOVASCULAR CONDITION: ICD-10-CM

## 2021-07-19 PROBLEM — K76.0 HEPATIC STEATOSIS: Status: ACTIVE | Noted: 2021-07-19

## 2021-08-17 ENCOUNTER — HOSPITAL ENCOUNTER (OUTPATIENT)
Dept: ULTRASOUND IMAGING | Facility: HOSPITAL | Age: 51
Discharge: HOME | End: 2021-08-17
Attending: FAMILY MEDICINE

## 2021-08-17 DIAGNOSIS — Z13.9 ENCOUNTER FOR SCREENING: ICD-10-CM

## 2021-09-28 RX ORDER — METFORMIN HYDROCHLORIDE 500 MG/1
TABLET, EXTENDED RELEASE ORAL
Qty: 270 TABLET | Refills: 0 | Status: SHIPPED | OUTPATIENT
Start: 2021-09-28 | End: 2021-12-21

## 2021-11-08 RX ORDER — METOPROLOL SUCCINATE 50 MG/1
50 TABLET, EXTENDED RELEASE ORAL
Qty: 90 TABLET | Refills: 1 | Status: SHIPPED | OUTPATIENT
Start: 2021-11-08

## 2021-12-22 RX ORDER — METFORMIN HYDROCHLORIDE 500 MG/1
1000 TABLET, EXTENDED RELEASE ORAL 2 TIMES DAILY WITH MEALS
Qty: 360 TABLET | Refills: 2 | Status: SHIPPED | OUTPATIENT
Start: 2021-12-22

## 2022-01-19 ENCOUNTER — OFFICE VISIT (OUTPATIENT)
Dept: FAMILY MEDICINE CLINIC | Facility: CLINIC | Age: 52
End: 2022-01-19
Payer: COMMERCIAL

## 2022-01-19 VITALS
DIASTOLIC BLOOD PRESSURE: 82 MMHG | BODY MASS INDEX: 35.16 KG/M2 | SYSTOLIC BLOOD PRESSURE: 132 MMHG | HEART RATE: 72 BPM | WEIGHT: 232 LBS | HEIGHT: 68 IN | OXYGEN SATURATION: 96 %

## 2022-01-19 DIAGNOSIS — E78.2 MIXED HYPERLIPIDEMIA: ICD-10-CM

## 2022-01-19 DIAGNOSIS — Z23 NEED FOR VACCINATION: ICD-10-CM

## 2022-01-19 DIAGNOSIS — Z12.11 COLON CANCER SCREENING: ICD-10-CM

## 2022-01-19 DIAGNOSIS — E11.65 TYPE 2 DIABETES MELLITUS WITH HYPERGLYCEMIA, WITHOUT LONG-TERM CURRENT USE OF INSULIN (HCC): Primary | ICD-10-CM

## 2022-01-19 LAB
CARTRIDGE LOT#: 846 NUMERIC
HEMOGLOBIN A1C: 9.5 % (ref 4.3–5.6)

## 2022-01-19 PROCEDURE — 90472 IMMUNIZATION ADMIN EACH ADD: CPT | Performed by: FAMILY MEDICINE

## 2022-01-19 PROCEDURE — 83036 HEMOGLOBIN GLYCOSYLATED A1C: CPT | Performed by: FAMILY MEDICINE

## 2022-01-19 PROCEDURE — 3079F DIAST BP 80-89 MM HG: CPT | Performed by: FAMILY MEDICINE

## 2022-01-19 PROCEDURE — 90732 PPSV23 VACC 2 YRS+ SUBQ/IM: CPT | Performed by: FAMILY MEDICINE

## 2022-01-19 PROCEDURE — 90471 IMMUNIZATION ADMIN: CPT | Performed by: FAMILY MEDICINE

## 2022-01-19 PROCEDURE — 90686 IIV4 VACC NO PRSV 0.5 ML IM: CPT | Performed by: FAMILY MEDICINE

## 2022-01-19 PROCEDURE — 99214 OFFICE O/P EST MOD 30 MIN: CPT | Performed by: FAMILY MEDICINE

## 2022-01-19 PROCEDURE — 3008F BODY MASS INDEX DOCD: CPT | Performed by: FAMILY MEDICINE

## 2022-01-19 PROCEDURE — 3075F SYST BP GE 130 - 139MM HG: CPT | Performed by: FAMILY MEDICINE

## 2022-01-19 PROCEDURE — 3046F HEMOGLOBIN A1C LEVEL >9.0%: CPT | Performed by: FAMILY MEDICINE

## 2022-01-19 RX ORDER — DULAGLUTIDE 0.75 MG/.5ML
0.75 INJECTION, SOLUTION SUBCUTANEOUS WEEKLY
Qty: 2 ML | Refills: 0 | Status: SHIPPED | OUTPATIENT
Start: 2022-01-19

## 2022-01-19 NOTE — PROGRESS NOTES
CC:  Patient presents with: Follow - Up: diabetes  Other: would like a flu shot      HPI: 49year old male here to follow-up on Type 2 DM. Still working two jobs and both have been very stressful.   His daughter is also away at school and that is a source Vaping Use      Vaping Use: Never used    Substance and Sexual Activity      Alcohol use:  Yes        Alcohol/week: 0.0 standard drinks        Comment: 6-7 drinks a month       Drug use: No      Sexual activity: Yes        Partners: Female    Other Topics Clear bilaterally, good air entry  Dermatologic:  No rashes or lesions    Recent Results (from the past 24 hour(s))   HEMOGLOBIN A1C    Collection Time: 01/19/22  2:33 PM   Result Value Ref Range    HEMOGLOBIN A1C 9.5 (A) 4.3 - 5.6 %    Cartridge Lot# [id number]

## 2022-01-20 ENCOUNTER — MED REC SCAN ONLY (OUTPATIENT)
Dept: FAMILY MEDICINE CLINIC | Facility: CLINIC | Age: 52
End: 2022-01-20

## 2022-02-22 RX ORDER — DULAGLUTIDE 0.75 MG/.5ML
INJECTION, SOLUTION SUBCUTANEOUS
Qty: 2 ML | Refills: 0 | Status: SHIPPED | OUTPATIENT
Start: 2022-02-22 | End: 2022-03-30 | Stop reason: DRUGHIGH

## 2022-03-30 RX ORDER — DULAGLUTIDE 0.75 MG/.5ML
0.75 INJECTION, SOLUTION SUBCUTANEOUS WEEKLY
Qty: 2 ML | Refills: 0 | OUTPATIENT
Start: 2022-03-30

## 2022-03-30 RX ORDER — DULAGLUTIDE 1.5 MG/.5ML
1.5 INJECTION, SOLUTION SUBCUTANEOUS WEEKLY
Qty: 6 ML | Refills: 0 | Status: SHIPPED | OUTPATIENT
Start: 2022-03-30

## 2022-03-30 NOTE — TELEPHONE ENCOUNTER
Please find out how he is doing on this medication because if he is tolerating it well, I would like to increase to 1.5 mg weekly for more A1C lowering benefits.

## 2022-03-30 NOTE — TELEPHONE ENCOUNTER
Dr. Brett Griffin -  agreeable to increase , tolerating well. Patient returned our call. Patient's date of birth and full name both confirmed. RN informed patient of provider's message as detailed below. He is agreeable to increase as noted below. States he is tolerating medication well and agreeable to increase. Pharmacy updated.

## 2022-05-04 ENCOUNTER — OFFICE VISIT (OUTPATIENT)
Dept: FAMILY MEDICINE CLINIC | Facility: CLINIC | Age: 52
End: 2022-05-04
Payer: COMMERCIAL

## 2022-05-04 VITALS
SYSTOLIC BLOOD PRESSURE: 128 MMHG | WEIGHT: 221 LBS | BODY MASS INDEX: 33.49 KG/M2 | HEIGHT: 68 IN | HEART RATE: 80 BPM | OXYGEN SATURATION: 98 % | DIASTOLIC BLOOD PRESSURE: 82 MMHG

## 2022-05-04 DIAGNOSIS — Z23 NEED FOR VACCINATION: ICD-10-CM

## 2022-05-04 DIAGNOSIS — R53.83 FATIGUE, UNSPECIFIED TYPE: ICD-10-CM

## 2022-05-04 DIAGNOSIS — E11.65 TYPE 2 DIABETES MELLITUS WITH HYPERGLYCEMIA, WITHOUT LONG-TERM CURRENT USE OF INSULIN (HCC): Primary | ICD-10-CM

## 2022-05-04 LAB
CARTRIDGE LOT#: 903 NUMERIC
HEMOGLOBIN A1C: 7.1 % (ref 4.3–5.6)

## 2022-05-04 PROCEDURE — 99213 OFFICE O/P EST LOW 20 MIN: CPT | Performed by: FAMILY MEDICINE

## 2022-05-04 PROCEDURE — 3008F BODY MASS INDEX DOCD: CPT | Performed by: FAMILY MEDICINE

## 2022-05-04 PROCEDURE — 3074F SYST BP LT 130 MM HG: CPT | Performed by: FAMILY MEDICINE

## 2022-05-04 PROCEDURE — 83036 HEMOGLOBIN GLYCOSYLATED A1C: CPT | Performed by: FAMILY MEDICINE

## 2022-05-04 PROCEDURE — 3051F HG A1C>EQUAL 7.0%<8.0%: CPT | Performed by: FAMILY MEDICINE

## 2022-05-04 PROCEDURE — 3079F DIAST BP 80-89 MM HG: CPT | Performed by: FAMILY MEDICINE

## 2022-05-04 RX ORDER — METFORMIN HYDROCHLORIDE 500 MG/1
1000 TABLET, EXTENDED RELEASE ORAL 2 TIMES DAILY WITH MEALS
Qty: 360 TABLET | Refills: 1 | Status: SHIPPED | OUTPATIENT
Start: 2022-05-04

## 2022-05-12 ENCOUNTER — NURSE ONLY (OUTPATIENT)
Dept: GASTROENTEROLOGY | Facility: CLINIC | Age: 52
End: 2022-05-12

## 2022-05-12 DIAGNOSIS — Z12.11 SCREEN FOR COLON CANCER: Primary | ICD-10-CM

## 2022-05-12 NOTE — PROGRESS NOTES
Dr. Wyatt Newton,     Called patient for a scheduled telephone colon screening. GI MD preference: none  Insurance:  [employer]  No recent CBC on file  PCP visit on: 5/4/2022  First colonoscopy: yes     Last Procedure, Date, MD:   Previous CLN 2011 @ [facility]     Anticoagulants: no  Diabetic Meds: METFORMIN BID & TRUCILITY: (every Wednesday)  BP meds(Ace inhibitors/ARB's): no   Weight loss meds (phentermine/vyvanse): no   Iron supplement (RX/OTC): MVI   Height & Weight/BMI: 5'8\"/221lbs/33  Hx of Cardiac/CVA issues/(MI/Stroke): no   Devices Pacemaker/Defibrillator/Stents: no   Resp. Issues/Oxygen Use/ORALIA/COPD: ORALIA- w/CPAP  Issues w/Anesthesia: no     Symptoms (Y/N): no     Family history of colon cancer: no     Medications, pharmacy, and allergies verified with patient over the phone. Please advise on orders and prep, thank you.

## 2022-05-12 NOTE — PROGRESS NOTES
Called patient to help assist with scheduling procedure.  [facility]

## 2022-05-12 NOTE — PROGRESS NOTES
Ok to schedule colonoscopy with MAC with split golytely for colorectal cancer screening at [facility] metformin and trulicity the day before and day of the procedure    Thanks    Harsha Baker MD  [address] - Gastroenterology

## 2022-05-14 ENCOUNTER — HOSPITAL ENCOUNTER (OUTPATIENT)
Age: 52
Discharge: HOME | End: 2022-05-14
Payer: COMMERCIAL

## 2022-05-14 VITALS
HEART RATE: 109 BPM | SYSTOLIC BLOOD PRESSURE: 98 MMHG | TEMPERATURE: 97 F | DIASTOLIC BLOOD PRESSURE: 64 MMHG | RESPIRATION RATE: 17 BRPM | OXYGEN SATURATION: 99 %

## 2022-05-14 DIAGNOSIS — L50.0 ALLERGIC URTICARIA: Primary | ICD-10-CM

## 2022-05-14 PROCEDURE — 99203 OFFICE O/P NEW LOW 30 MIN: CPT | Performed by: NURSE PRACTITIONER

## 2022-05-14 RX ORDER — DIPHENHYDRAMINE HCL 25 MG
50 CAPSULE ORAL EVERY 6 HOURS PRN
Qty: 30 CAPSULE | Refills: 0 | Status: SHIPPED | OUTPATIENT
Start: 2022-05-14

## 2022-05-14 RX ORDER — DIPHENHYDRAMINE HCL 25 MG
50 CAPSULE ORAL ONCE
Status: COMPLETED | OUTPATIENT
Start: 2022-05-14 | End: 2022-05-14

## 2022-05-14 RX ORDER — FAMOTIDINE 20 MG/1
20 TABLET, FILM COATED ORAL ONCE
Status: COMPLETED | OUTPATIENT
Start: 2022-05-14 | End: 2022-05-14

## 2022-05-14 RX ORDER — PREDNISONE 20 MG/1
40 TABLET ORAL DAILY
Qty: 10 TABLET | Refills: 0 | Status: SHIPPED | OUTPATIENT
Start: 2022-05-14 | End: 2022-05-19

## 2022-05-14 RX ORDER — PREDNISONE 20 MG/1
40 TABLET ORAL ONCE
Status: COMPLETED | OUTPATIENT
Start: 2022-05-14 | End: 2022-05-14

## 2022-05-14 RX ORDER — FAMOTIDINE 20 MG/1
20 TABLET, FILM COATED ORAL DAILY
Qty: 5 TABLET | Refills: 0 | Status: SHIPPED | OUTPATIENT
Start: 2022-05-14 | End: 2022-05-19

## 2022-05-14 NOTE — ED INITIAL ASSESSMENT (HPI)
Pt here with a generalized rash to his body that developed yesterday , pt states rash is very itchy , pt denies any fever or sob

## 2022-05-16 NOTE — PROGRESS NOTES
Called patient to help assist with scheduling procedure. Several dates discussed with patient. Unable to select a date at this time states he will call back when ready.

## 2022-05-17 RX ORDER — METOPROLOL SUCCINATE 50 MG/1
50 TABLET, EXTENDED RELEASE ORAL DAILY
Qty: 90 TABLET | Refills: 1 | Status: SHIPPED | OUTPATIENT
Start: 2022-05-17 | End: 2022-08-01

## 2022-05-17 NOTE — TELEPHONE ENCOUNTER
Refill passed per Kili, [facility] protocol.      Requested Prescriptions   Pending Prescriptions Disp Refills    METOPROLOL SUCCINATE ER 50 MG Oral Tablet 24 Hr [Pharmacy Med Name: Metoprolol Succinate ER Oral Tablet Extended Release 24 Hour 50 MG] 90 tablet 0     Sig: TAKE  1 TABLET BY MOUTH DAILY        Hypertensive Medications Protocol Passed - 5/17/2022 10:33 AM        Passed - CMP or BMP in past 12 months        Passed - Appointment in past 6 or next 3 months        Passed - GFR Non- > 50     Lab Results   Component Value Date    GFRNAA 86 06/11/2021                         Recent Outpatient Visits              5 days ago     Margaret Mckenna 57 GI PROCEDURE    Nurse Only    1 week ago Type 2 diabetes mellitus with hyperglycemia, without long-term current use of insulin [facility])    Conor Noland, [address], [place]    Office Visit    3 months ago Type 2 diabetes mellitus with hyperglycemia, without long-term current use of insulin [facility])    [address], [facility] [address], [place]    Office Visit    11 months ago Encounter for routine adult health examination without abnormal findings    [address], [place]    Office Visit    1 year ago Type 2 diabetes mellitus with hyperglycemia, without long-term current use of insulin [facility])    Santino Pang Ruthine Miners, [facility] Visit             Future Appointments         Provider Department Appt Notes    In 2 months Juan Daniel Beltran, Karson Pang [address] Physical and lab

## 2022-05-18 NOTE — PROGRESS NOTES
Scheduled for:  Colonoscopy-screen [address]    Provider Name:  Dr. Yumiko Vigil  Date:  8/29/2022  Location:  [facility] [place]- Due to health insurance type he can not go to [facility]; also patient is requesting a Monday. Sedation:  MAC  Time:  2:00pm (pt is aware to arrive at 1:00 pm)  Prep:  Split dose golytely (message sent to MD; refer above)   Meds/Allergies Reconciled?:  Physician reviewed  Diagnosis with codes:  Colorectal cancer screening Z12.11  Was patient informed to call insurance with codes (Y/N):  I confirmed [employer] with this patient. Referral sent?:  Referral was sent at the time of electronic surgical scheduling. [facility] or [facility] notified?:  I sent an electronic request to Endo Scheduling and received a confirmation today. Medication Orders:  HOLD METFORMIN AND TRULICITY THE DAY BEFORE AND DAY OF PROCEDURE. Misc Orders:  Patient was informed that they will need a COVID 19 test prior to their procedure. Patient verbally understood & will await a phone call from [facility] to schedule. Further instructions given by staff:  I discussed the prep instructions with the patient which he verbally understood and is aware that I will send the instructions today via [address]

## 2022-05-18 NOTE — PROGRESS NOTES
Prep sent    Thanks    Brian Cooley MD  [facility] - Gastroenterology  5/18/2022  4:49 PM

## 2022-06-20 RX ORDER — DULAGLUTIDE 1.5 MG/.5ML
INJECTION, SOLUTION SUBCUTANEOUS
Qty: 6 ML | Refills: 0 | Status: SHIPPED | OUTPATIENT
Start: 2022-06-20

## 2022-06-24 ENCOUNTER — NURSE TRIAGE (OUTPATIENT)
Dept: FAMILY MEDICINE CLINIC | Facility: CLINIC | Age: 52
End: 2022-06-24

## 2022-06-24 ENCOUNTER — LAB ENCOUNTER (OUTPATIENT)
Dept: LAB | Facility: HOSPITAL | Age: 52
End: 2022-06-24
Attending: FAMILY MEDICINE
Payer: COMMERCIAL

## 2022-06-24 DIAGNOSIS — Z00.00 ROUTINE GENERAL MEDICAL EXAMINATION AT A HEALTH CARE FACILITY: Primary | ICD-10-CM

## 2022-06-24 DIAGNOSIS — E11.65 TYPE 2 DIABETES MELLITUS WITH HYPERGLYCEMIA, WITHOUT LONG-TERM CURRENT USE OF INSULIN (HCC): ICD-10-CM

## 2022-06-24 DIAGNOSIS — Z12.5 PROSTATE CANCER SCREENING: ICD-10-CM

## 2022-06-24 LAB — AMB EXT COVID-19 RESULT: DETECTED

## 2022-06-24 NOTE — TELEPHONE ENCOUNTER
During a conference call with Aurora Pereira, lab . Patient instructed as to the steps to follow to get the BMP checked prior to starting the paxlovid. Patient will need to wait in the parking lot for a tech to call him when they are ready to take him in for testing. Per Dr. Dionne Steele; ok to order a stat BMP for this issue and patient can complete annual labs after 10 day quarantine.

## 2022-06-24 NOTE — TELEPHONE ENCOUNTER
Patient called back stated lab deny him & told him to come back in 5 days    Patient did not make appointment with lab dept.     Advised him to call and explain that without the lab medication can't taken for Covid

## 2022-06-24 NOTE — TELEPHONE ENCOUNTER
I'm trying to figure out how to get this policy changed but in the meantime, I would have him go to the IC for either Monoclonal antibody infusion or at least to do a point of care BMP prior to starting the Paxlovid.

## 2022-06-24 NOTE — TELEPHONE ENCOUNTER
Spoke to patient and relayed Dr. Roy Dial message below. Patient verbalized understanding and had no further questions.

## 2022-06-24 NOTE — TELEPHONE ENCOUNTER
Could prescribe Paxlovid but he would need to get his labs done at [facility] (can not do it in [facility] due to policy of not drawing actively infected Covid patients) to ensure normal renal function since last 6/2021. If agreeable, will order Paxlovid to closest available pharmacy and he can start it today pending results.

## 2022-06-24 NOTE — TELEPHONE ENCOUNTER
Dr Mcgovern Bending: patient agreed to get labs completed today at [facility]. Are you able to send paxlovid to [facility] on file?

## 2022-06-24 NOTE — TELEPHONE ENCOUNTER
Medication sent as requested to [facility]. If his kidney function is normal, he can continue to same dose for 5 days as prescribed and if not, he will hear from me.

## 2022-06-25 ENCOUNTER — LAB ENCOUNTER (OUTPATIENT)
Dept: LAB | Facility: HOSPITAL | Age: 52
End: 2022-06-25
Attending: FAMILY MEDICINE
Payer: COMMERCIAL

## 2022-06-25 DIAGNOSIS — E11.65 TYPE 2 DIABETES MELLITUS WITH HYPERGLYCEMIA, WITHOUT LONG-TERM CURRENT USE OF INSULIN (HCC): ICD-10-CM

## 2022-06-25 LAB
ANION GAP SERPL CALC-SCNC: 9 MMOL/L (ref 0–18)
BUN BLD-MCNC: 11 MG/DL (ref 7–18)
BUN/CREAT SERPL: 9.2 (ref 10–20)
CALCIUM BLD-MCNC: 9.1 MG/DL (ref 8.5–10.1)
CHLORIDE SERPL-SCNC: 106 MMOL/L (ref 98–112)
CO2 SERPL-SCNC: 27 MMOL/L (ref 21–32)
CREAT BLD-MCNC: 1.2 MG/DL
FASTING STATUS PATIENT QL REPORTED: YES
GLUCOSE BLD-MCNC: 142 MG/DL (ref 70–99)
OSMOLALITY SERPL CALC.SUM OF ELEC: 296 MOSM/KG (ref 275–295)
POTASSIUM SERPL-SCNC: 4 MMOL/L (ref 3.5–5.1)
SODIUM SERPL-SCNC: 142 MMOL/L (ref 136–145)

## 2022-06-25 PROCEDURE — 80048 BASIC METABOLIC PNL TOTAL CA: CPT

## 2022-06-25 PROCEDURE — 36415 COLL VENOUS BLD VENIPUNCTURE: CPT

## 2022-08-01 ENCOUNTER — OFFICE VISIT (OUTPATIENT)
Dept: FAMILY MEDICINE CLINIC | Facility: CLINIC | Age: 52
End: 2022-08-01
Payer: COMMERCIAL

## 2022-08-01 ENCOUNTER — LAB ENCOUNTER (OUTPATIENT)
Dept: LAB | Facility: REFERENCE LAB | Age: 52
End: 2022-08-01
Attending: FAMILY MEDICINE
Payer: COMMERCIAL

## 2022-08-01 VITALS
WEIGHT: 208 LBS | OXYGEN SATURATION: 100 % | HEIGHT: 68 IN | BODY MASS INDEX: 31.52 KG/M2 | HEART RATE: 84 BPM | DIASTOLIC BLOOD PRESSURE: 78 MMHG | SYSTOLIC BLOOD PRESSURE: 124 MMHG

## 2022-08-01 DIAGNOSIS — Z12.5 PROSTATE CANCER SCREENING: ICD-10-CM

## 2022-08-01 DIAGNOSIS — Z00.00 ROUTINE GENERAL MEDICAL EXAMINATION AT A HEALTH CARE FACILITY: Primary | ICD-10-CM

## 2022-08-01 DIAGNOSIS — E11.9 TYPE 2 DIABETES MELLITUS WITHOUT COMPLICATION, WITHOUT LONG-TERM CURRENT USE OF INSULIN (HCC): ICD-10-CM

## 2022-08-01 DIAGNOSIS — Z23 NEED FOR VACCINATION: ICD-10-CM

## 2022-08-01 DIAGNOSIS — Z00.00 ROUTINE GENERAL MEDICAL EXAMINATION AT A HEALTH CARE FACILITY: ICD-10-CM

## 2022-08-01 LAB
ALBUMIN SERPL-MCNC: 3.9 G/DL (ref 3.4–5)
ALBUMIN/GLOB SERPL: 1.1 {RATIO} (ref 1–2)
ALP LIVER SERPL-CCNC: 70 U/L
ALT SERPL-CCNC: 24 U/L
ANION GAP SERPL CALC-SCNC: 9 MMOL/L (ref 0–18)
AST SERPL-CCNC: 13 U/L (ref 15–37)
BASOPHILS # BLD AUTO: 0.05 X10(3) UL (ref 0–0.2)
BASOPHILS NFR BLD AUTO: 0.6 %
BILIRUB SERPL-MCNC: 0.4 MG/DL (ref 0.1–2)
BUN BLD-MCNC: 16 MG/DL (ref 7–18)
BUN/CREAT SERPL: 12.6 (ref 10–20)
CALCIUM BLD-MCNC: 9.4 MG/DL (ref 8.5–10.1)
CHLORIDE SERPL-SCNC: 105 MMOL/L (ref 98–112)
CHOLEST SERPL-MCNC: 185 MG/DL (ref ?–200)
CO2 SERPL-SCNC: 25 MMOL/L (ref 21–32)
COMPLEXED PSA SERPL-MCNC: 1.11 NG/ML (ref ?–4)
CREAT BLD-MCNC: 1.27 MG/DL
CREAT UR-SCNC: 304 MG/DL
DEPRECATED RDW RBC AUTO: 46.8 FL (ref 35.1–46.3)
EOSINOPHIL # BLD AUTO: 0.33 X10(3) UL (ref 0–0.7)
EOSINOPHIL NFR BLD AUTO: 4 %
ERYTHROCYTE [DISTWIDTH] IN BLOOD BY AUTOMATED COUNT: 14.2 % (ref 11–15)
EST. AVERAGE GLUCOSE BLD GHB EST-MCNC: 166 MG/DL (ref 68–126)
FASTING PATIENT LIPID ANSWER: NO
FASTING STATUS PATIENT QL REPORTED: NO
GLOBULIN PLAS-MCNC: 3.7 G/DL (ref 2.8–4.4)
GLUCOSE BLD-MCNC: 224 MG/DL (ref 70–99)
HBA1C MFR BLD: 7.4 % (ref ?–5.7)
HCT VFR BLD AUTO: 44.8 %
HDLC SERPL-MCNC: 39 MG/DL (ref 40–59)
HGB BLD-MCNC: 14 G/DL
IMM GRANULOCYTES # BLD AUTO: 0.06 X10(3) UL (ref 0–1)
IMM GRANULOCYTES NFR BLD: 0.7 %
LDLC SERPL CALC-MCNC: 118 MG/DL (ref ?–100)
LYMPHOCYTES # BLD AUTO: 2.33 X10(3) UL (ref 1–4)
LYMPHOCYTES NFR BLD AUTO: 28.3 %
MCH RBC QN AUTO: 28.2 PG (ref 26–34)
MCHC RBC AUTO-ENTMCNC: 31.3 G/DL (ref 31–37)
MCV RBC AUTO: 90.1 FL
MICROALBUMIN UR-MCNC: 2.37 MG/DL
MICROALBUMIN/CREAT 24H UR-RTO: 7.8 UG/MG (ref ?–30)
MONOCYTES # BLD AUTO: 0.52 X10(3) UL (ref 0.1–1)
MONOCYTES NFR BLD AUTO: 6.3 %
NEUTROPHILS # BLD AUTO: 4.93 X10 (3) UL (ref 1.5–7.7)
NEUTROPHILS # BLD AUTO: 4.93 X10(3) UL (ref 1.5–7.7)
NEUTROPHILS NFR BLD AUTO: 60.1 %
NONHDLC SERPL-MCNC: 146 MG/DL (ref ?–130)
OSMOLALITY SERPL CALC.SUM OF ELEC: 296 MOSM/KG (ref 275–295)
PLATELET # BLD AUTO: 352 10(3)UL (ref 150–450)
POTASSIUM SERPL-SCNC: 4.1 MMOL/L (ref 3.5–5.1)
PROT SERPL-MCNC: 7.6 G/DL (ref 6.4–8.2)
RBC # BLD AUTO: 4.97 X10(6)UL
SODIUM SERPL-SCNC: 139 MMOL/L (ref 136–145)
TRIGL SERPL-MCNC: 157 MG/DL (ref 30–149)
VLDLC SERPL CALC-MCNC: 28 MG/DL (ref 0–30)
WBC # BLD AUTO: 8.2 X10(3) UL (ref 4–11)

## 2022-08-01 PROCEDURE — 82043 UR ALBUMIN QUANTITATIVE: CPT

## 2022-08-01 PROCEDURE — 3008F BODY MASS INDEX DOCD: CPT | Performed by: FAMILY MEDICINE

## 2022-08-01 PROCEDURE — 82570 ASSAY OF URINE CREATININE: CPT

## 2022-08-01 PROCEDURE — 99396 PREV VISIT EST AGE 40-64: CPT | Performed by: FAMILY MEDICINE

## 2022-08-01 PROCEDURE — 90471 IMMUNIZATION ADMIN: CPT | Performed by: FAMILY MEDICINE

## 2022-08-01 PROCEDURE — 3074F SYST BP LT 130 MM HG: CPT | Performed by: FAMILY MEDICINE

## 2022-08-01 PROCEDURE — 83036 HEMOGLOBIN GLYCOSYLATED A1C: CPT

## 2022-08-01 PROCEDURE — 80061 LIPID PANEL: CPT

## 2022-08-01 PROCEDURE — 36415 COLL VENOUS BLD VENIPUNCTURE: CPT

## 2022-08-01 PROCEDURE — 3078F DIAST BP <80 MM HG: CPT | Performed by: FAMILY MEDICINE

## 2022-08-01 PROCEDURE — 90750 HZV VACC RECOMBINANT IM: CPT | Performed by: FAMILY MEDICINE

## 2022-08-01 PROCEDURE — 85025 COMPLETE CBC W/AUTO DIFF WBC: CPT

## 2022-08-01 PROCEDURE — 80053 COMPREHEN METABOLIC PANEL: CPT

## 2022-08-01 RX ORDER — METOPROLOL SUCCINATE 50 MG/1
50 TABLET, EXTENDED RELEASE ORAL DAILY
Qty: 90 TABLET | Refills: 3 | Status: SHIPPED | OUTPATIENT
Start: 2022-08-01

## 2022-08-01 RX ORDER — DEXMETHYLPHENIDATE HYDROCHLORIDE 40 MG/1
40 CAPSULE, EXTENDED RELEASE ORAL
COMMUNITY

## 2022-08-01 RX ORDER — METFORMIN HYDROCHLORIDE 500 MG/1
1000 TABLET, EXTENDED RELEASE ORAL 2 TIMES DAILY WITH MEALS
Qty: 360 TABLET | Refills: 1 | Status: SHIPPED | OUTPATIENT
Start: 2022-08-01

## 2022-08-29 ENCOUNTER — ANESTHESIA (OUTPATIENT)
Dept: ENDOSCOPY | Facility: HOSPITAL | Age: 52
End: 2022-08-29
Payer: COMMERCIAL

## 2022-08-29 ENCOUNTER — ANESTHESIA EVENT (OUTPATIENT)
Dept: ENDOSCOPY | Facility: HOSPITAL | Age: 52
End: 2022-08-29
Payer: COMMERCIAL

## 2022-08-29 ENCOUNTER — HOSPITAL ENCOUNTER (OUTPATIENT)
Facility: HOSPITAL | Age: 52
Setting detail: HOSPITAL OUTPATIENT SURGERY
Discharge: HOME | End: 2022-08-29
Attending: INTERNAL MEDICINE | Admitting: INTERNAL MEDICINE
Payer: COMMERCIAL

## 2022-08-29 VITALS
SYSTOLIC BLOOD PRESSURE: 112 MMHG | HEART RATE: 77 BPM | BODY MASS INDEX: 30.51 KG/M2 | OXYGEN SATURATION: 99 % | WEIGHT: 206 LBS | TEMPERATURE: 98 F | RESPIRATION RATE: 24 BRPM | HEIGHT: 69 IN | DIASTOLIC BLOOD PRESSURE: 75 MMHG

## 2022-08-29 DIAGNOSIS — Z12.11 SCREEN FOR COLON CANCER: ICD-10-CM

## 2022-08-29 LAB — GLUCOSE BLDC GLUCOMTR-MCNC: 111 MG/DL (ref 70–99)

## 2022-08-29 PROCEDURE — 88305 TISSUE EXAM BY PATHOLOGIST: CPT | Performed by: INTERNAL MEDICINE

## 2022-08-29 PROCEDURE — 82962 GLUCOSE BLOOD TEST: CPT

## 2022-08-29 PROCEDURE — 0DBM8ZX EXCISION OF DESCENDING COLON, VIA NATURAL OR ARTIFICIAL OPENING ENDOSCOPIC, DIAGNOSTIC: ICD-10-PCS | Performed by: INTERNAL MEDICINE

## 2022-08-29 PROCEDURE — 0DBH8ZX EXCISION OF CECUM, VIA NATURAL OR ARTIFICIAL OPENING ENDOSCOPIC, DIAGNOSTIC: ICD-10-PCS | Performed by: INTERNAL MEDICINE

## 2022-08-29 RX ORDER — SODIUM CHLORIDE, SODIUM LACTATE, POTASSIUM CHLORIDE, CALCIUM CHLORIDE 600; 310; 30; 20 MG/100ML; MG/100ML; MG/100ML; MG/100ML
INJECTION, SOLUTION INTRAVENOUS CONTINUOUS
OUTPATIENT
Start: 2022-08-29

## 2022-08-29 RX ORDER — NICOTINE POLACRILEX 4 MG
30 LOZENGE BUCCAL
OUTPATIENT
Start: 2022-08-29

## 2022-08-29 RX ORDER — DEXTROSE MONOHYDRATE 25 G/50ML
50 INJECTION, SOLUTION INTRAVENOUS
OUTPATIENT
Start: 2022-08-29

## 2022-08-29 RX ORDER — NICOTINE POLACRILEX 4 MG
15 LOZENGE BUCCAL
OUTPATIENT
Start: 2022-08-29

## 2022-08-29 RX ORDER — LIDOCAINE HYDROCHLORIDE 10 MG/ML
INJECTION, SOLUTION EPIDURAL; INFILTRATION; INTRACAUDAL; PERINEURAL AS NEEDED
Status: DISCONTINUED | OUTPATIENT
Start: 2022-08-29 | End: 2022-08-29 | Stop reason: SURG

## 2022-08-29 RX ORDER — NALOXONE HYDROCHLORIDE 0.4 MG/ML
80 INJECTION, SOLUTION INTRAMUSCULAR; INTRAVENOUS; SUBCUTANEOUS AS NEEDED
OUTPATIENT
Start: 2022-08-29 | End: 2022-08-29

## 2022-08-29 RX ORDER — SODIUM CHLORIDE, SODIUM LACTATE, POTASSIUM CHLORIDE, CALCIUM CHLORIDE 600; 310; 30; 20 MG/100ML; MG/100ML; MG/100ML; MG/100ML
INJECTION, SOLUTION INTRAVENOUS CONTINUOUS
Status: DISCONTINUED | OUTPATIENT
Start: 2022-08-29 | End: 2022-08-29

## 2022-08-29 RX ADMIN — SODIUM CHLORIDE, SODIUM LACTATE, POTASSIUM CHLORIDE, CALCIUM CHLORIDE: 600; 310; 30; 20 INJECTION, SOLUTION INTRAVENOUS at 14:25:00

## 2022-08-29 RX ADMIN — LIDOCAINE HYDROCHLORIDE 50 MG: 10 INJECTION, SOLUTION EPIDURAL; INFILTRATION; INTRACAUDAL; PERINEURAL at 14:02:00

## 2022-08-29 RX ADMIN — SODIUM CHLORIDE, SODIUM LACTATE, POTASSIUM CHLORIDE, CALCIUM CHLORIDE: 600; 310; 30; 20 INJECTION, SOLUTION INTRAVENOUS at 13:59:00

## 2022-09-01 ENCOUNTER — TELEPHONE (OUTPATIENT)
Dept: GASTROENTEROLOGY | Facility: CLINIC | Age: 52
End: 2022-09-01

## 2022-09-01 NOTE — TELEPHONE ENCOUNTER
Health maintenance updated. Last colonoscopy done 8/29/22. 5 year recall placed into [facility], next due on 8/29/27 per Dr. Trell Salmon.

## 2022-09-01 NOTE — TELEPHONE ENCOUNTER
I called and spoke to the patient. I discussed his colon polyps were benign. I discussed there was some mild irritation in a small area of the colon noted based on endoscopic findings and path. Discussed this was non-specific. He denies any chronic gastrointestinal symptoms. Discussed I recommend repeating the colonoscopy in 5 years and letting me know if symptoms develop, which he says he will do. Appreciative of call.     GI staff: please place recall for colonoscopy in 5 years     Then close encounter    Thanks    Dina Leung MD  [facility], [facility] - Gastroenterology  9/1/2022  4:54 PM

## 2022-09-02 ENCOUNTER — MED REC SCAN ONLY (OUTPATIENT)
Dept: GASTROENTEROLOGY | Facility: CLINIC | Age: 52
End: 2022-09-02

## 2022-09-12 RX ORDER — DULAGLUTIDE 1.5 MG/.5ML
1.5 INJECTION, SOLUTION SUBCUTANEOUS WEEKLY
Qty: 6 ML | Refills: 0 | Status: SHIPPED | OUTPATIENT
Start: 2022-09-12

## 2022-09-12 NOTE — TELEPHONE ENCOUNTER
Please review. Protocol failed / No Protocol. Requested Prescriptions   Pending Prescriptions Disp Refills    Dulaglutide (TRULICITY) 1.5 WO/6.7TS Subcutaneous Solution Pen-injector 6 mL 0     Sig: Inject 1.5 mg into the skin once a week.         Diabetes Medication Protocol Failed - 9/12/2022  2:34 PM        Failed - GFR in the past 12 months        Passed - Last A1C < 7.5 and within past 6 months     Lab Results   Component Value Date    A1C 7.4 (H) 08/01/2022               Passed - In person appointment or virtual visit in the past 6 mos or appointment in next 3 mos       Recent Outpatient Visits              1 month ago Routine general medical examination at a health care facility    [address] , [address], [facility] Visit    4 months ago Screen for colon cancer    [facility]    Nurse Only    4 months ago Type 2 diabetes mellitus with hyperglycemia, without long-term current use of insulin [facility])    [address] [place] [address]    Office Visit    7 months ago Type 2 diabetes mellitus with hyperglycemia, without long-term current use of insulin [facility])    [address] [place]    Office Visit    1 year ago Encounter for routine adult health examination without abnormal findings    [address] [place]    Office Visit                 Passed - GFR > 50     No results found for: [facility]                     Recent Outpatient Visits              1 month ago Routine general medical examination at a health care facility    [address] , [place] [address], [facility], [facility]    4 months ago Screen for colon cancer    [facility]    Nurse Only    4 months ago Type 2 diabetes mellitus with hyperglycemia, without long-term current use of insulin [facility])    [address] , [address], [address] [place], [place]    Office Visit    7 months ago Type 2 diabetes mellitus with hyperglycemia, without long-term current use of insulin [facility])    [address] [facility], [address] [place] [address]    Office Visit    1 year ago Encounter for routine adult health examination without abnormal findings    [address] [facility],[address], [address] [place] [address]    Office Visit

## 2023-03-06 RX ORDER — DULAGLUTIDE 1.5 MG/.5ML
1.5 INJECTION, SOLUTION SUBCUTANEOUS WEEKLY
Qty: 6 ML | Refills: 0 | Status: SHIPPED | OUTPATIENT
Start: 2023-03-06

## 2023-03-06 NOTE — TELEPHONE ENCOUNTER
Please review; protocol failed/ no protocol  Medication pended for your review and approval.     Requested Prescriptions   Pending Prescriptions Disp Refills    TRULICITY 1.5 JG/6.6FM Subcutaneous Solution Pen-injector [Pharmacy Med Name: Trulicity Subcutaneous Solution Pen-injector 1.5 MG/0.5ML] 6 mL 0     Sig: inject 1.5mg into the skin once a week       Diabetes Medication Protocol Failed - 3/4/2023  8:35 AM        Failed - Last A1C < 7.5 and within past 6 months     Lab Results   Component Value Date    A1C 7.4 (H) 08/01/2022             Failed - In person appointment or virtual visit in the past 6 mos or appointment in next 3 mos     Recent Outpatient Visits              7 months ago Routine general medical examination at a health care facility    [address], [place] [address], [address] [facility] Visit    9 months ago Screen for colon cancer    Uzma Anguiano, [address] [facility], [place]    Nurse Only    10 months ago Type 2 diabetes mellitus with hyperglycemia, without long-term current use of insulin ([facility] [address]    [address] Redman, [address] [place], [place]    Office Visit    1 year ago Type 2 diabetes mellitus with hyperglycemia, without long-term current use of insulin [facility])    [address], [address] [place], [place]    Office Visit    1 year ago Encounter for routine adult health examination without abnormal findings    Kenney Redman, [address] [place], [place]    Office Visit                      Passed - EGFRCR or GFRNAA > 50     GFR Evaluation  GFRNAA: 65 , resulted on 8/1/2022          Passed - GFR in the past 12 months

## 2023-03-27 RX ORDER — METFORMIN HYDROCHLORIDE 500 MG/1
1000 TABLET, EXTENDED RELEASE ORAL 2 TIMES DAILY WITH MEALS
Qty: 360 TABLET | Refills: 1 | Status: SHIPPED | OUTPATIENT
Start: 2023-03-27

## 2023-05-15 ENCOUNTER — OFFICE VISIT (OUTPATIENT)
Facility: CLINIC | Age: 53
End: 2023-05-15
Payer: COMMERCIAL

## 2023-05-15 VITALS
HEART RATE: 81 BPM | DIASTOLIC BLOOD PRESSURE: 74 MMHG | BODY MASS INDEX: 32.44 KG/M2 | SYSTOLIC BLOOD PRESSURE: 122 MMHG | OXYGEN SATURATION: 100 % | WEIGHT: 219 LBS | HEIGHT: 69 IN

## 2023-05-15 DIAGNOSIS — E78.2 MIXED HYPERLIPIDEMIA: ICD-10-CM

## 2023-05-15 DIAGNOSIS — E11.65 TYPE 2 DIABETES MELLITUS WITH HYPERGLYCEMIA, WITHOUT LONG-TERM CURRENT USE OF INSULIN (HCC): Primary | ICD-10-CM

## 2023-05-15 LAB
CARTRIDGE LOT#: 56 NUMERIC
HEMOGLOBIN A1C: 7.8 % (ref 4.3–5.6)

## 2023-05-15 RX ORDER — ROSUVASTATIN CALCIUM 10 MG/1
10 TABLET, COATED ORAL NIGHTLY
Qty: 90 TABLET | Refills: 1 | Status: SHIPPED | OUTPATIENT
Start: 2023-05-15

## 2023-05-15 RX ORDER — DULAGLUTIDE 3 MG/.5ML
3 INJECTION, SOLUTION SUBCUTANEOUS WEEKLY
Qty: 6 ML | Refills: 1 | Status: SHIPPED | OUTPATIENT
Start: 2023-05-15

## 2023-05-15 RX ORDER — AMPICILLIN TRIHYDRATE 250 MG
1 CAPSULE ORAL DAILY
COMMUNITY

## 2023-08-31 RX ORDER — METOPROLOL SUCCINATE 50 MG/1
50 TABLET, EXTENDED RELEASE ORAL DAILY
Qty: 90 TABLET | Refills: 2 | Status: SHIPPED | OUTPATIENT
Start: 2023-08-31

## 2023-09-13 ENCOUNTER — PATIENT MESSAGE (OUTPATIENT)
Facility: CLINIC | Age: 53
End: 2023-09-13

## 2023-09-13 DIAGNOSIS — Z00.00 ROUTINE GENERAL MEDICAL EXAMINATION AT A HEALTH CARE FACILITY: ICD-10-CM

## 2023-09-13 DIAGNOSIS — E11.65 TYPE 2 DIABETES MELLITUS WITH HYPERGLYCEMIA, WITHOUT LONG-TERM CURRENT USE OF INSULIN (HCC): ICD-10-CM

## 2023-09-13 DIAGNOSIS — Z12.5 PROSTATE CANCER SCREENING: ICD-10-CM

## 2023-09-13 DIAGNOSIS — E78.2 MIXED HYPERLIPIDEMIA: Primary | ICD-10-CM

## 2023-09-18 ENCOUNTER — LAB ENCOUNTER (OUTPATIENT)
Dept: LAB | Facility: REFERENCE LAB | Age: 53
End: 2023-09-18
Attending: FAMILY MEDICINE
Payer: COMMERCIAL

## 2023-09-18 ENCOUNTER — OFFICE VISIT (OUTPATIENT)
Facility: CLINIC | Age: 53
End: 2023-09-18
Payer: COMMERCIAL

## 2023-09-18 VITALS
SYSTOLIC BLOOD PRESSURE: 122 MMHG | OXYGEN SATURATION: 98 % | WEIGHT: 214 LBS | HEIGHT: 69 IN | BODY MASS INDEX: 31.7 KG/M2 | HEART RATE: 80 BPM | DIASTOLIC BLOOD PRESSURE: 84 MMHG

## 2023-09-18 DIAGNOSIS — Z12.5 PROSTATE CANCER SCREENING: ICD-10-CM

## 2023-09-18 DIAGNOSIS — Z23 NEED FOR VACCINATION: ICD-10-CM

## 2023-09-18 DIAGNOSIS — Z00.00 ENCOUNTER FOR ROUTINE ADULT HEALTH EXAMINATION WITHOUT ABNORMAL FINDINGS: Primary | ICD-10-CM

## 2023-09-18 DIAGNOSIS — I10 HYPERTENSION WITH GOAL BLOOD PRESSURE LESS THAN 140/90: ICD-10-CM

## 2023-09-18 DIAGNOSIS — E11.65 TYPE 2 DIABETES MELLITUS WITH HYPERGLYCEMIA, WITHOUT LONG-TERM CURRENT USE OF INSULIN (HCC): ICD-10-CM

## 2023-09-18 DIAGNOSIS — Z00.00 ROUTINE GENERAL MEDICAL EXAMINATION AT A HEALTH CARE FACILITY: ICD-10-CM

## 2023-09-18 DIAGNOSIS — E78.2 MIXED HYPERLIPIDEMIA: ICD-10-CM

## 2023-09-18 LAB
ALBUMIN SERPL-MCNC: 4 G/DL (ref 3.4–5)
ALBUMIN/GLOB SERPL: 1.1 {RATIO} (ref 1–2)
ALP LIVER SERPL-CCNC: 54 U/L
ALT SERPL-CCNC: 34 U/L
ANION GAP SERPL CALC-SCNC: 9 MMOL/L (ref 0–18)
AST SERPL-CCNC: 21 U/L (ref 15–37)
BASOPHILS # BLD AUTO: 0.04 X10(3) UL (ref 0–0.2)
BASOPHILS NFR BLD AUTO: 0.5 %
BILIRUB SERPL-MCNC: 0.6 MG/DL (ref 0.1–2)
BUN BLD-MCNC: 12 MG/DL (ref 7–18)
BUN/CREAT SERPL: 11.2 (ref 10–20)
CALCIUM BLD-MCNC: 9.3 MG/DL (ref 8.5–10.1)
CHLORIDE SERPL-SCNC: 109 MMOL/L (ref 98–112)
CHOLEST SERPL-MCNC: 119 MG/DL (ref ?–200)
CO2 SERPL-SCNC: 22 MMOL/L (ref 21–32)
COMPLEXED PSA SERPL-MCNC: 0.9 NG/ML (ref ?–4)
CREAT BLD-MCNC: 1.07 MG/DL
CREAT UR-SCNC: 462 MG/DL
DEPRECATED RDW RBC AUTO: 43.8 FL (ref 35.1–46.3)
EGFRCR SERPLBLD CKD-EPI 2021: 83 ML/MIN/1.73M2 (ref 60–?)
EOSINOPHIL # BLD AUTO: 0.23 X10(3) UL (ref 0–0.7)
EOSINOPHIL NFR BLD AUTO: 3 %
ERYTHROCYTE [DISTWIDTH] IN BLOOD BY AUTOMATED COUNT: 13.6 % (ref 11–15)
EST. AVERAGE GLUCOSE BLD GHB EST-MCNC: 183 MG/DL (ref 68–126)
FASTING PATIENT LIPID ANSWER: YES
FASTING STATUS PATIENT QL REPORTED: YES
GLOBULIN PLAS-MCNC: 3.7 G/DL (ref 2.8–4.4)
GLUCOSE BLD-MCNC: 129 MG/DL (ref 70–99)
HBA1C MFR BLD: 8 % (ref ?–5.7)
HCT VFR BLD AUTO: 41.9 %
HDLC SERPL-MCNC: 50 MG/DL (ref 40–59)
HGB BLD-MCNC: 13.5 G/DL
IMM GRANULOCYTES # BLD AUTO: 0.03 X10(3) UL (ref 0–1)
IMM GRANULOCYTES NFR BLD: 0.4 %
LDLC SERPL CALC-MCNC: 52 MG/DL (ref ?–100)
LYMPHOCYTES # BLD AUTO: 2 X10(3) UL (ref 1–4)
LYMPHOCYTES NFR BLD AUTO: 26 %
MCH RBC QN AUTO: 28.4 PG (ref 26–34)
MCHC RBC AUTO-ENTMCNC: 32.2 G/DL (ref 31–37)
MCV RBC AUTO: 88 FL
MICROALBUMIN UR-MCNC: 3.45 MG/DL
MICROALBUMIN/CREAT 24H UR-RTO: 7.5 UG/MG (ref ?–30)
MONOCYTES # BLD AUTO: 0.63 X10(3) UL (ref 0.1–1)
MONOCYTES NFR BLD AUTO: 8.2 %
NEUTROPHILS # BLD AUTO: 4.76 X10 (3) UL (ref 1.5–7.7)
NEUTROPHILS # BLD AUTO: 4.76 X10(3) UL (ref 1.5–7.7)
NEUTROPHILS NFR BLD AUTO: 61.9 %
NONHDLC SERPL-MCNC: 69 MG/DL (ref ?–130)
OSMOLALITY SERPL CALC.SUM OF ELEC: 291 MOSM/KG (ref 275–295)
PLATELET # BLD AUTO: 303 10(3)UL (ref 150–450)
POTASSIUM SERPL-SCNC: 3.7 MMOL/L (ref 3.5–5.1)
PROT SERPL-MCNC: 7.7 G/DL (ref 6.4–8.2)
RBC # BLD AUTO: 4.76 X10(6)UL
SODIUM SERPL-SCNC: 140 MMOL/L (ref 136–145)
TRIGL SERPL-MCNC: 85 MG/DL (ref 30–149)
VLDLC SERPL CALC-MCNC: 12 MG/DL (ref 0–30)
WBC # BLD AUTO: 7.7 X10(3) UL (ref 4–11)

## 2023-09-18 PROCEDURE — 80061 LIPID PANEL: CPT

## 2023-09-18 PROCEDURE — 82043 UR ALBUMIN QUANTITATIVE: CPT

## 2023-09-18 PROCEDURE — 80053 COMPREHEN METABOLIC PANEL: CPT

## 2023-09-18 PROCEDURE — 36415 COLL VENOUS BLD VENIPUNCTURE: CPT

## 2023-09-18 PROCEDURE — 82570 ASSAY OF URINE CREATININE: CPT

## 2023-09-18 PROCEDURE — 85025 COMPLETE CBC W/AUTO DIFF WBC: CPT

## 2023-09-18 PROCEDURE — 83036 HEMOGLOBIN GLYCOSYLATED A1C: CPT

## 2023-10-06 NOTE — TELEPHONE ENCOUNTER
Please review. Protocol failed / Has no protocol.      Requested Prescriptions   Pending Prescriptions Disp Refills    METFORMIN  MG Oral Tablet 24 Hr [Pharmacy Med Name: metFORMIN HCl ER Oral Tablet Extended Release 24 Hour 500 MG] 360 tablet 0     Sig: TAKE TWO TABLETS BY MOUTH TWICE DAILY with meals       Diabetes Medication Protocol Failed - 10/5/2023  6:32 AM        Failed - Last A1C < 7.5 and within past 6 months     Lab Results   Component Value Date    A1C 8.0 (H) 09/18/2023             Passed - In person appointment or virtual visit in the past 6 mos or appointment in next 3 mos     Recent Outpatient Visits              2 weeks ago Encounter for routine adult health examination without abnormal findings    Alfonzo Huerta, [address] [place], [place]    Office Visit    4 months ago Type 2 diabetes mellitus with hyperglycemia, without long-term current use of insulin [facility])    [address], [place], [place]    Office Visit    1 year ago Routine general medical examination at a health care facility    [address], [place], [address] [place] Visit    1 year ago Screen for colon cancer    Pacheco Jackson, [address] [facility] [address] [address], [facility]    Nurse Only    1 year ago Type 2 diabetes mellitus with hyperglycemia, without long-term current use of insulin ([facility]    [address], [place], [address] [place] Visit                      Passed - EGFRCR or GFRNAA > 50     GFR Evaluation  EGFRCR: 83 , resulted on 9/18/2023          Passed - GFR in the past 12 months              Recent Outpatient Visits              2 weeks ago Encounter for routine adult health examination without abnormal findings    [address], [place]    Office Visit    4 months ago Type 2 diabetes mellitus with hyperglycemia, without long-term current use of insulin [facility])    [address] [facility], [address] [place], [place]    Office Visit    1 year ago Routine general medical examination at a health care facility    [address], [address], [address] [facility], [facility] Visit    1 year ago Screen for colon cancer    [address], [address], [place]    Nurse Only    1 year ago Type 2 diabetes mellitus with hyperglycemia, without long-term current use of insulin [facility])    [address], [address], [address] [place], [place]    Office Visit

## 2023-10-09 RX ORDER — METFORMIN HYDROCHLORIDE 500 MG/1
1000 TABLET, EXTENDED RELEASE ORAL 2 TIMES DAILY WITH MEALS
Qty: 360 TABLET | Refills: 1 | Status: SHIPPED | OUTPATIENT
Start: 2023-10-09

## 2023-10-13 NOTE — TELEPHONE ENCOUNTER
Please review; protocol failed. Please see patients MyChart Message. Davin SINHA Em Triage Support2 days ago       Refills have been requested for the following medications:         Dulaglutide (TRULICITY) 3 AT/3.5IJ Subcutaneous Solution Pen-injector [Argelia De Jesus]      Patient Comment: Toby Fisher Nip a message for a possible increase of dosage in this refill. Waiting for an answer. Requested Prescriptions   Pending Prescriptions Disp Refills    Dulaglutide (TRULICITY) 3 DN/7.4JE Subcutaneous Solution Pen-injector 6 mL 1     Sig: Inject 3 mg into the skin once a week.        Diabetes Medication Protocol Failed - 10/11/2023  5:38 PM        Failed - Last A1C < 7.5 and within past 6 months     Lab Results   Component Value Date    A1C 8.0 (H) 09/18/2023             Passed - In person appointment or virtual visit in the past 6 mos or appointment in next 3 mos     Recent Outpatient Visits              3 weeks ago Encounter for routine adult health examination without abnormal findings    [address] [facility], [address] [place], [place]    Office Visit    5 months ago Type 2 diabetes mellitus with hyperglycemia, without long-term current use of insulin [facility])    [address], [address], [address] [place], [place]    Office Visit    1 year ago Routine general medical examination at a health care facility    [address], [address], [facility] Visit    1 year ago Screen for colon cancer    [address], [place], [address]    Nurse Only    1 year ago Type 2 diabetes mellitus with hyperglycemia, without long-term current use of insulin [facility])    [address], [address], [place], [place], [place]    Office Visit                      Passed - EGFRCR or GFRNAA > 50     GFR Evaluation  EGFRCR: 83 , resulted on 9/18/2023          Passed - GFR in the past 12 months           Recent Outpatient Visits 3 weeks ago Encounter for routine adult health examination without abnormal findings    [address], [place]    Office Visit    5 months ago Type 2 diabetes mellitus with hyperglycemia, without long-term current use of insulin [facility])    [address], [address] [place], [place]    Office Visit    1 year ago Routine general medical examination at a health care facility    [address], [address] [facility], [facility] Visit    1 year ago Screen for colon cancer    Yamini Santiago, [address] [facility] [address], [place]    Nurse Only    1 year ago Type 2 diabetes mellitus with hyperglycemia, without long-term current use of insulin [facility])    [address], [address] [place], [place]    Office Visit

## 2023-10-14 RX ORDER — DULAGLUTIDE 3 MG/.5ML
3 INJECTION, SOLUTION SUBCUTANEOUS WEEKLY
Qty: 6 ML | Refills: 1 | OUTPATIENT
Start: 2023-10-14

## 2023-11-01 ENCOUNTER — TELEPHONE (OUTPATIENT)
Facility: CLINIC | Age: 53
End: 2023-11-01

## 2023-11-02 ENCOUNTER — MED REC SCAN ONLY (OUTPATIENT)
Facility: CLINIC | Age: 53
End: 2023-11-02

## 2024-01-03 DIAGNOSIS — E11.65 TYPE 2 DIABETES MELLITUS WITH HYPERGLYCEMIA, WITHOUT LONG-TERM CURRENT USE OF INSULIN (HCC): ICD-10-CM

## 2024-01-05 RX ORDER — DULAGLUTIDE 4.5 MG/.5ML
4.5 INJECTION, SOLUTION SUBCUTANEOUS WEEKLY
Qty: 6 ML | Refills: 0 | Status: SHIPPED | OUTPATIENT
Start: 2024-01-05

## 2024-01-05 NOTE — TELEPHONE ENCOUNTER
Protocol Failed/ No Protocol    Requested Prescriptions   Pending Prescriptions Disp Refills    TRULICITY 4.5 MG/0.5ML Subcutaneous Solution Pen-injector [Pharmacy Med Name: Trulicity Subcutaneous Solution Pen-injector 4.5 MG/0.5ML] 6 mL 0     Sig: INJECT 4.5MG INTO THE SKIN ONCE A WEEK       Diabetes Medication Protocol Failed - 1/3/2024  1:27 PM        Failed - Last A1C < 7.5 and within past 6 months     Lab Results   Component Value Date    A1C 8.0 (H) 09/18/2023             Passed - In person appointment or virtual visit in the past 6 mos or appointment in next 3 mos     Recent Outpatient Visits              3 months ago Encounter for routine adult health examination without abnormal findings    [facility],     Office Visit    7 months ago Type 2 diabetes mellitus with hyperglycemia, without long-term current use of insulin ([facility])    [facility] Ar[place]ia De Jesus,     Office Visit    1 year ago Routine general medical examination at a health care facility    [facility] [place],     Office Visit    1 year ago Screen for colon cancer    [facility]    Nurse Only    1 year ago Type 2 diabetes mellitus with hyperglycemia, without long-term current use of insulin ([facility])    [facility],     Office Visit                      Passed - EGFRCR or GFRNAA > 50     GFR Evaluation  EGFRCR: 83 , resulted on 9/18/2023          Passed - GFR in the past 12 months               Recent Outpatient Visits              3 months ago Encounter for routine adult health examination without abnormal findings    [facility],     Office Visit    7 months ago Type 2 diabetes mellitus with hyperglycemia, without long-term current use of insulin ([facility])     [facility]     Office Visit    1 year ago Routine general medical examination at a health care facility    [facility] [place] [facility],     Office Visit    1 year ago Screen for colon cancer    [facility]    Nurse Only    1 year ago Type 2 diabetes mellitus with hyperglycemia, without long-term current use of insulin (HCC)    [facility]     Office Visit

## 2024-01-22 ENCOUNTER — OFFICE VISIT (OUTPATIENT)
Facility: CLINIC | Age: 54
End: 2024-01-22
Payer: COMMERCIAL

## 2024-01-22 VITALS
WEIGHT: 212 LBS | BODY MASS INDEX: 31.4 KG/M2 | HEART RATE: 81 BPM | OXYGEN SATURATION: 99 % | HEIGHT: 69 IN | SYSTOLIC BLOOD PRESSURE: 120 MMHG | DIASTOLIC BLOOD PRESSURE: 72 MMHG

## 2024-01-22 DIAGNOSIS — E11.65 TYPE 2 DIABETES MELLITUS WITH HYPERGLYCEMIA, WITHOUT LONG-TERM CURRENT USE OF INSULIN (HCC): Primary | ICD-10-CM

## 2024-01-22 LAB
CARTRIDGE LOT#: 634 NUMERIC
HEMOGLOBIN A1C: 8.1 % (ref 4.3–5.6)

## 2024-01-22 RX ORDER — DEXMETHYLPHENIDATE HYDROCHLORIDE 40 MG/1
40 CAPSULE, EXTENDED RELEASE ORAL DAILY
COMMUNITY

## 2024-01-22 RX ORDER — TIRZEPATIDE 7.5 MG/.5ML
7.5 INJECTION, SOLUTION SUBCUTANEOUS WEEKLY
Qty: 2 ML | Refills: 0 | Status: SHIPPED | OUTPATIENT
Start: 2024-01-22

## 2024-01-22 NOTE — PROGRESS NOTES
CC:    Chief Complaint   Patient presents with    Diabetes       HPI: 53 year old male here to follow-up on Type 2 diabetes.  Has been under a lot of stress with his work demands and his wife and daughter both being in school.   He does feel he would benefit from talking to a therapist, and plans to schedule with one soon.   He has been checking his blood sugars a little more regularly, and his fasting sugars have been 118-140 on average.  Trying to avoid pop, and also watching his intake of sugars.  Has been taking Trulicity 4.5 mg weekly, and has not had any problems with availability.   Also taking Metformin 1,000 mg twice daily.   He does feel a little dizzy at times, but when he checks his blood sugars it is normal.   He drinks unsweetened iced tea mostly, and has cut back on his coffee consumption.     ROS:  General:  No fever, no fatigue, no weight changes  HEENT:  Denies congestion or nasal discharge  Cardio:  No chest pain  Pulmonary:  No cough, no SOB  GI:  No N/V, occasional diarrhea, no constipation   :  No discharge, no dysuria, no polyuria, no hematuria  Dermatologic:  No rashes  Neuro: intermittent dizziness, no headaches   Endo: no polyuria or polydipsia     Past Medical History:   Diagnosis Date    ADHD (attention deficit hyperactivity disorder)     Anxiety     Controlled and managed.    Borderline diabetes     Borderline hypertension     Depression     Controlled and managed.    Diabetes (HCC)     Essential hypertension 2019    Hemorrhoid     High blood pressure     Hyperlipidemia 2018    Obesity     Managed.    Shingles     Sleep apnea     Wears CPAP       Social History     Socioeconomic History    Marital status:      Spouse name: Not on file    Number of children: Not on file    Years of education: Not on file    Highest education level: Not on file   Occupational History    Not on file   Tobacco Use    Smoking status: Never    Smokeless tobacco: Never   Vaping Use    Vaping Use: Never  used   Substance and Sexual Activity    Alcohol use: Yes     Alcohol/week: 1.0 standard drink of alcohol     Types: 1 Standard drinks or equivalent per week     Comment: 5 drinks per month.    Drug use: No    Sexual activity: Yes     Partners: Female   Other Topics Concern    Caffeine Concern No    Exercise Yes    Seat Belt No    Special Diet No    Stress Concern Yes     Comment: Increased from beginning of the year 2023.    Weight Concern Yes     Comment: Wait loss from Trulicity, but recently gaining it back.   Social History Narrative    Not on file     Social Determinants of Health     Financial Resource Strain: Not on file   Food Insecurity: Not on file   Transportation Needs: Not on file   Physical Activity: Not on file   Stress: Not on file   Social Connections: Not on file   Housing Stability: Not on file       Current Outpatient Medications   Medication Sig Dispense Refill    Dexmethylphenidate HCl ER 40 MG Oral Capsule SR 24 Hr Take 1 capsule (40 mg total) by mouth daily.      rosuvastatin 10 MG Oral Tab Take 1 tablet (10 mg total) by mouth nightly. 90 tablet 3    Dulaglutide (TRULICITY) 4.5 MG/0.5ML Subcutaneous Solution Pen-injector Inject 4.5 mg into the skin once a week. 6 mL 0    metFORMIN  MG Oral Tablet 24 Hr Take 2 tablets (1,000 mg total) by mouth 2 (two) times daily with meals. 360 tablet 1    metoprolol succinate ER 50 MG Oral Tablet 24 Hr Take 1 tablet (50 mg total) by mouth daily. 90 tablet 2    Coenzyme Q10 (COQ10) 200 MG Oral Cap Take 1 capsule by mouth daily.      diphenhydrAMINE (BENADRYL ALLERGY) 25 MG Oral Cap Take 2 capsules (50 mg total) by mouth every 6 (six) hours as needed for Itching (rash). 30 capsule 0    guanFACINE HCl ER 3 MG Oral Tablet 24 Hr Take 1 tablet (3 mg total) by mouth nightly. TAKE AT BEDTIME      Multiple Vitamins-Minerals (MULTI-VITAMIN/MINERALS) Oral Tab Take 1 tablet by mouth daily.      BusPIRone HCl 30 MG Oral Tab Take 1.5 tablets (45 mg total) by mouth 2  (two) times daily. Takes 1.5 pills twice a day  0       Bee venom      Vitals:   Vitals:    01/22/24 0957   BP: 120/72   Pulse: 81   SpO2: 99%   Weight: 212 lb (96.2 kg)   Height: 5' 9\" (1.753 m)       Body mass index is 31.31 kg/m².    Physical:  General:  Alert, appropriate, no acute distress   HEENT: supple, no lymphadenopathy   Cardio:  RRR, no murmurs, S1, S2  Pulmonary:  Clear bilaterally, good air entry  Dermatologic:  No rashes or lesions    Recent Results (from the past 24 hour(s))   HEMOGLOBIN A1C    Collection Time: 01/22/24 10:29 AM   Result Value Ref Range    HEMOGLOBIN A1C 8.1 (A) 4.3 - 5.6 %    Cartridge Lot# [id number] Numeric    Cartridge Expiration Date 11/30/24 Date         Assessment and Plan: 53 year old male here to follow-up on Type 2 diabetes.    1. Type 2 diabetes mellitus with hyperglycemia, without long-term current use of insulin ([facility])    - A1C actually worsened to 8.1 despite increasing Trulicity dose to 4.5 mg weekly and continuing Metformin  - Likely partly related to stress, but given he is not well controlled on highest dose of Trulicity will switch to Mounjaro and start at 7.5 mg weekly  - Will notify me prior to running out of Mounjaro to determine if dose is appropriate or needs to increase  - Also will try weaning off Metformin if doing well on Mounjaro alone or he starts to develop hypoglycemia  - Plans Prevnar 20 at next visit   - HEMOGLOBIN A1C  - Tirzepatide (MOUNJARO) 7.5 MG/0.5ML Subcutaneous Solution Pen-injector; Inject 7.5 mg into the skin once a week.  Dispense: 2 mL; Refill: 0        Argelia De Jesus DO  01/22/24  10:15 AM

## 2024-03-02 DIAGNOSIS — E11.65 TYPE 2 DIABETES MELLITUS WITH HYPERGLYCEMIA, WITHOUT LONG-TERM CURRENT USE OF INSULIN (HCC): ICD-10-CM

## 2024-03-05 DIAGNOSIS — E11.65 TYPE 2 DIABETES MELLITUS WITH HYPERGLYCEMIA, WITHOUT LONG-TERM CURRENT USE OF INSULIN (HCC): ICD-10-CM

## 2024-03-05 RX ORDER — TIRZEPATIDE 7.5 MG/.5ML
7.5 INJECTION, SOLUTION SUBCUTANEOUS WEEKLY
Qty: 2 ML | Refills: 0 | Status: SHIPPED | OUTPATIENT
Start: 2024-03-05

## 2024-03-05 RX ORDER — TIRZEPATIDE 7.5 MG/.5ML
7.5 INJECTION, SOLUTION SUBCUTANEOUS WEEKLY
Qty: 2 ML | Refills: 0 | OUTPATIENT
Start: 2024-03-05

## 2024-03-05 NOTE — TELEPHONE ENCOUNTER
Please review, protocol failed/No protocol.    Patient comment: I'm tolerating the Mounjaaftab De Jesus.  No major side effects.     Requested Prescriptions   Pending Prescriptions Disp Refills    MOUNJARO 7.5 MG/0.5ML Subcutaneous Solution Pen-injector [Pharmacy Med Name: Mounjaro Subcutaneous Solution Pen-injector 7.5 MG/0.5ML] 2 mL 0     Sig: INJECT 7.5MG INTO THE SKIN ONCE A WEEK       Diabetes Medication Protocol Failed - 3/2/2024  3:19 PM        Failed - Last A1C < 7.5 and within past 6 months     Lab Results   Component Value Date    A1C 8.1 (A) 01/22/2024             Passed - In person appointment or virtual visit in the past 6 mos or appointment in next 3 mos     Recent Outpatient Visits              1 month ago Type 2 diabetes mellitus with hyperglycemia, without long-term current use of insulin ([facility])    [place] Argelia De Jesus DO    Office Visit    5 months ago Encounter for routine adult health examination without abnormal findings    [facility] [place] Argelia De Jesus DO    Office Visit    9 months ago Type 2 diabetes mellitus with hyperglycemia, without long-term current use of insulin ([facility])    [facility] Argelia De Jesus DO    Office Visit    1 year ago Routine general medical examination at a health care facility    [facility] [place] Argelia De Jesus DO    Office Visit    1 year ago Screen for colon cancer    [facility]    Nurse Only          Future Appointments         Provider Department Appt Notes    In 2 months Argelia De Jesus DO [facility] A1C check               Passed - Microalbumin procedure in past 12 months or taking ACE/ARB        Passed - EGFRCR or GFRNAA > 50     GFR Evaluation  EGFRCR: 83 , resulted on 9/18/2023          Passed - GFR in the past 12 months              Future Appointments         Provider Department Appt Notes    In 2 months Argelia De Jesus DO [facility] A1C check          Recent Outpatient Visits              1 month ago Type 2 diabetes mellitus with hyperglycemia, without long-term current use of insulin (HCC)    [facility] Argelia De Jesus,     Office Visit    5 months ago Encounter for routine adult health examination without abnormal findings    [facility] Argelia De Jesus,     Office Visit    9 months ago Type 2 diabetes mellitus with hyperglycemia, without long-term current use of insulin (HCC)    [facility],     Office Visit    1 year ago Routine general medical examination at a health care facility    [facility] Argelia De Jesus,     Office Visit    1 year ago Screen for colon cancer    [facility]    Nurse Only

## 2024-04-04 DIAGNOSIS — E11.65 TYPE 2 DIABETES MELLITUS WITH HYPERGLYCEMIA, WITHOUT LONG-TERM CURRENT USE OF INSULIN (HCC): ICD-10-CM

## 2024-04-05 RX ORDER — TIRZEPATIDE 7.5 MG/.5ML
7.5 INJECTION, SOLUTION SUBCUTANEOUS WEEKLY
Qty: 6 ML | Refills: 0 | Status: SHIPPED | OUTPATIENT
Start: 2024-04-05

## 2024-04-05 NOTE — TELEPHONE ENCOUNTER
Please review; Protocol Failed/ no protocol.  Rx Pended, authorize if appropriate    Requested Prescriptions   Pending Prescriptions Disp Refills    Tirzepatide (MOUNJARO) 7.5 MG/0.5ML Subcutaneous Solution Pen-injector 6 mL 3     Sig: Inject 7.5 mg into the skin once a week.       Diabetes Medication Protocol Failed - 4/4/2024 10:08 AM        Failed - Last A1C < 7.5 and within past 6 months     Lab Results   Component Value Date    A1C 8.1 (A) 01/22/2024             Passed - In person appointment or virtual visit in the past 6 mos or appointment in next 3 mos     Recent Outpatient Visits              2 months ago Type 2 diabetes mellitus with hyperglycemia, without long-term current use of insulin ([facility])    [facility] Argelia De Jesus DO    Office Visit    6 months ago Encounter for routine adult health examination without abnormal findings    [facility] Argelia De Jseus DO    Office Visit    10 months ago Type 2 diabetes mellitus with hyperglycemia, without long-term current use of insulin ([facility])    [facility] Argelia De Jesus DO    Office Visit    1 year ago Routine general medical examination at a health care facility    [facility] Argelia De Jesus DO    Office Visit    1 year ago Screen for colon cancer    [facility] [place]    Nurse Only          Future Appointments         Provider Department Appt Notes    In 1 month Argelia De Jesus DO [facility] A1C check               Passed - Microalbumin procedure in past 12 months or taking ACE/ARB        Passed - EGFRCR or GFRNAA > 50     GFR Evaluation  EGFRCR: 83 , resulted on 9/18/2023          Passed - GFR in the past 12 months

## 2024-04-18 RX ORDER — METFORMIN HYDROCHLORIDE 500 MG/1
1000 TABLET, EXTENDED RELEASE ORAL 2 TIMES DAILY WITH MEALS
Qty: 360 TABLET | Refills: 1 | Status: SHIPPED | OUTPATIENT
Start: 2024-04-18

## 2024-04-18 NOTE — TELEPHONE ENCOUNTER
Please review; protocol failed/No Protocol    Requested Prescriptions   Pending Prescriptions Disp Refills    metFORMIN  MG Oral Tablet 24 Hr 360 tablet 1     Sig: Take 2 tablets (1,000 mg total) by mouth 2 (two) times daily with meals.       Diabetes Medication Protocol Failed - 4/17/2024  3:32 PM        Failed - Last A1C < 7.5 and within past 6 months     Lab Results   Component Value Date    A1C 8.1 (A) 01/22/2024             Passed - In person appointment or virtual visit in the past 6 mos or appointment in next 3 mos     Recent Outpatient Visits              2 months ago Type 2 diabetes mellitus with hyperglycemia, without long-term current use of insulin ([facility])    [facility] Argelia De Jesus,     Office Visit    7 months ago Encounter for routine adult health examination without abnormal findings    [facility] Argelia De Jesus DO    Office Visit    11 months ago Type 2 diabetes mellitus with hyperglycemia, without long-term current use of insulin ([facility])    [facility] Argelia De Jesus DO    Office Visit    1 year ago Routine general medical examination at a health care facility    [facility] Argelia De Jesus,     Office Visit    1 year ago Screen for colon cancer    [facility]    Nurse Only          Future Appointments         Provider Department Appt Notes    In 3 weeks Argelia De Jesus DO [facility] A1C check                    Passed - Microalbumin procedure in past 12 months or taking ACE/ARB        Passed - EGFRCR or GFRNAA > 50     GFR Evaluation  EGFRCR: 83 , resulted on 9/18/2023          Passed - GFR in the past 12 months           Future Appointments         Provider Department Appt Notes    In 3 weeks Argelia De Jesus DO [facility]  [place] A1C check          Recent Outpatient Visits              2 months ago Type 2 diabetes mellitus with hyperglycemia, without long-term current use of insulin (HCC)    [facility] [place] B[facility] Alison,     Office Visit    7 months ago Encounter for routine adult health examination without abnormal findings    [facility] [place] B[facility] [place],     Office Visit    11 months ago Type 2 diabetes mellitus with hyperglycemia, without long-term current use of insulin (HCC)    [facility] [place] Bui Alison,     Office Visit    1 year ago Routine general medical examination at a health care facility    [facility] [place] Bui Alison,     Office Visit    1 year ago Screen for colon cancer    [facility]    [facility]urse Only

## 2024-05-10 ENCOUNTER — OFFICE VISIT (OUTPATIENT)
Facility: CLINIC | Age: 54
End: 2024-05-10
Payer: COMMERCIAL

## 2024-05-10 VITALS
DIASTOLIC BLOOD PRESSURE: 80 MMHG | SYSTOLIC BLOOD PRESSURE: 126 MMHG | HEIGHT: 69 IN | WEIGHT: 206 LBS | HEART RATE: 74 BPM | OXYGEN SATURATION: 100 % | BODY MASS INDEX: 30.51 KG/M2

## 2024-05-10 DIAGNOSIS — Z23 NEED FOR VACCINATION: ICD-10-CM

## 2024-05-10 DIAGNOSIS — E11.65 TYPE 2 DIABETES MELLITUS WITH HYPERGLYCEMIA, WITHOUT LONG-TERM CURRENT USE OF INSULIN (HCC): Primary | ICD-10-CM

## 2024-05-10 LAB — HEMOGLOBIN A1C: 7.7 % (ref 4.3–5.6)

## 2024-05-10 PROCEDURE — 83036 HEMOGLOBIN GLYCOSYLATED A1C: CPT | Performed by: FAMILY MEDICINE

## 2024-05-10 PROCEDURE — 99214 OFFICE O/P EST MOD 30 MIN: CPT | Performed by: FAMILY MEDICINE

## 2024-05-10 PROCEDURE — 90677 PCV20 VACCINE IM: CPT | Performed by: FAMILY MEDICINE

## 2024-05-10 PROCEDURE — 90471 IMMUNIZATION ADMIN: CPT | Performed by: FAMILY MEDICINE

## 2024-05-10 RX ORDER — TIRZEPATIDE 10 MG/.5ML
10 INJECTION, SOLUTION SUBCUTANEOUS WEEKLY
Qty: 2 ML | Refills: 2 | Status: SHIPPED | OUTPATIENT
Start: 2024-05-10

## 2024-05-10 NOTE — PROGRESS NOTES
CC:    Chief Complaint   Patient presents with    Diabetes     Follow up       HPI: 54 year old male here to follow-up on Type 2 diabetes.  Stress levels have been higher, but they should get better in the fall.  Has been on Mounjaro 7.5 mg weekly since the end of February.  States his bowel movements have been off at times as he will feel more constipation, but increasing fiber helps.   Has been having more dry mouth lately so he has increased his intake of water and unsweetened iced tea.   Has a Coke occasionally, but only twice a week.  He is eating more vegetables since his wife and daughter have been away.  He has also continued taking Metformin 1,000 mg twice daily.   He has been checking his blood sugars more often.   On average his fasting blood sugars have been 120-140, but sometimes higher if he is more stressed and eating more sweets  Does feel the Mounjaro helps a little with the cravings.     ROS:  General:  No fever, no fatigue, intentional weight loss, decreased appetite   HEENT:  Denies congestion or nasal discharge, dry mouth, no vision changes   Cardio:  No chest pain  Pulmonary:  No cough, no SOB  GI:  No N/V/D, constipation, no hematochezia   :  No discharge, no dysuria, no polyuria, no hematuria  Dermatologic:  No rashes    Past Medical History:    ADHD (attention deficit hyperactivity disorder)    Anxiety    Controlled and managed.    Borderline diabetes    Borderline hypertension    Depression    Controlled and managed.    Diabetes (HCC)    Essential hypertension    Hemorrhoid    High blood pressure    Hyperlipidemia    Obesity    Managed.    Shingles    Sleep apnea    Wears CPAP       Social History     Socioeconomic History    Marital status:      Spouse name: Not on file    Number of children: Not on file    Years of education: Not on file    Highest education level: Not on file   Occupational History    Not on file   Tobacco Use    Smoking status: Never    Smokeless tobacco: Never    Vaping Use    Vaping status: Never Used   Substance and Sexual Activity    Alcohol use: Yes     Alcohol/week: 1.0 standard drink of alcohol     Types: 1 Standard drinks or equivalent per week     Comment: 5 drinks per month.    Drug use: No    Sexual activity: Yes     Partners: Female   Other Topics Concern    Caffeine Concern No    Exercise Yes    Seat Belt No    Special Diet No    Stress Concern Yes     Comment: Increased from beginning of the year 2023.    Weight Concern Yes     Comment: Wait loss from [employer], but recently gaining it back.   Social History Narrative    Not on file     Social Determinants of Health     Financial Resource Strain: Not on file   Food Insecurity: Not on file   Transportation Needs: Not on file   Physical Activity: Not on file   Stress: Not on file   Social Connections: Not on file   Housing Stability: Not on file       Current Outpatient Medications   Medication Sig Dispense Refill    metFORMIN  MG Oral Tablet 24 Hr Take 2 tablets (1,000 mg total) by mouth 2 (two) times daily with meals. 360 tablet 1    Tirzepatide (MOUNJARO) 7.5 MG/0.5ML Subcutaneous Solution Pen-injector Inject 7.5 mg into the skin once a week. 6 mL 0    Dexmethylphenidate HCl ER 40 MG Oral Capsule SR 24 Hr Take 1 capsule (40 mg total) by mouth daily.      rosuvastatin 10 MG Oral Tab Take 1 tablet (10 mg total) by mouth nightly. 90 tablet 3    metoprolol succinate ER 50 MG Oral Tablet 24 Hr Take 1 tablet (50 mg total) by mouth daily. 90 tablet 2    Coenzyme Q10 (COQ10) 200 MG Oral Cap Take 1 capsule by mouth daily.      diphenhydrAMINE (BENADRYL ALLERGY) 25 MG Oral Cap Take 2 capsules (50 mg total) by mouth every 6 (six) hours as needed for Itching (rash). 30 capsule 0    guanFACINE HCl ER 3 MG Oral Tablet 24 Hr Take 1 tablet (3 mg total) by mouth nightly. TAKE AT BEDTIME      Multiple Vitamins-Minerals (MULTI-VITAMIN/MINERALS) Oral Tab Take 1 tablet by mouth daily.      BusPIRone HCl 30 MG Oral Tab Take  1.5 tablets (45 mg total) by mouth 2 (two) times daily. Takes 1.5 pills twice a day  0       Bee venom      Vitals:   Vitals:    05/10/24 1101   BP: 126/80   Pulse: 74   SpO2: 100%   Weight: 206 lb (93.4 kg)   Height: 5' 9\" (1.753 m)       Body mass index is 30.42 kg/m².    Physical:  General:  Alert, appropriate, no acute distress   HEENT: supple, no lymphadenopathy   Cardio:  RRR, no murmurs, S1, S2  Pulmonary:  Clear bilaterally, good air entry  Dermatologic:  No rashes or lesions  Ext: no cyanosis or edema     Recent Results (from the past 24 hour(s))   POC Glycohemoglobin [23707]    Collection Time: 05/10/24 11:24 AM   Result Value Ref Range    HEMOGLOBIN A1C 7.7 (A) 4.3 - 5.6 %    Cartridge Lot# [id number] Numeric    Cartridge Expiration Date 12/4/25 Date         Assessment and Plan: 54 year old male here to follow-up on Type 2 diabetes.     1. Type 2 diabetes mellitus with hyperglycemia, without long-term current use of insulin ([facility])    - A1C improved, but only slightly today, and partially related to stress and increased intake of sweets at times  - Will increase Mounjaro to 10 mg weekly, and advise adding a fiber supplement to improve constipation  - Prevnar 20 given today  - Return in 4 months for physical and blood work   - POC Glycohemoglobin [78635]  - Tirzepatide (MOUNJARO) 10 MG/0.5ML Subcutaneous Solution Pen-injector; Inject 10 mg into the skin once a week.  Dispense: 2 mL; Refill: 2    2. Need for vaccination    - PCV20 (Prevnar 20)        Argelia De Jesus DO  05/10/24  11:08 AM

## 2024-07-08 RX ORDER — METOPROLOL SUCCINATE 50 MG/1
50 TABLET, EXTENDED RELEASE ORAL DAILY
Qty: 30 TABLET | Refills: 11 | Status: SHIPPED | OUTPATIENT
Start: 2024-07-08

## 2024-07-08 NOTE — TELEPHONE ENCOUNTER
Refill Per Protocol     Requested Prescriptions   Pending Prescriptions Disp Refills    metoprolol succinate ER 50 MG Oral Tablet 24 Hr 90 tablet 2     Sig: Take 1 tablet (50 mg total) by mouth daily.       Hypertension Medications Protocol Passed - 7/2/2024  1:29 PM        Passed - CMP or BMP in past 12 months        Passed - Last BP reading less than 140/90     BP Readings from Last 1 Encounters:   05/10/24 126/80               Passed - In person appointment or virtual visit in the past 12 mos or appointment in next 3 mos     Recent Outpatient Visits              1 month ago Type 2 diabetes mellitus with hyperglycemia, without long-term current use of insulin ([facility])    [facility] Argelia De Jesus,     Office Visit    5 months ago Type 2 diabetes mellitus with hyperglycemia, without long-term current use of insulin ([facility])    [facility] Argelia De Jesus,     Office Visit    9 months ago Encounter for routine adult health examination without abnormal findings    [facility] Argelia De Jesus DO    Office Visit    1 year ago Type 2 diabetes mellitus with hyperglycemia, without long-term current use of insulin ([facility])    [facility] Argelia De Jesus,     Office Visit    1 year ago Routine general medical examination at a health care facility    [facility] Argelia De Jesus,     Office Visit          Future Appointments         Provider Department Appt Notes    In 2 months Argelia De Jesus DO [facility]                     Passed - EGFRCR or GFRNAA > 50     GFR Evaluation  EGFRCR: 83 , resulted on 9/18/2023                 Future Appointments         Provider Department Appt Notes    In 2 months Argelia De Jesus DO [facility]           Recent Outpatient  Visits              1 month ago Type 2 diabetes mellitus with hyperglycemia, without long-term current use of insulin ([facility])    [facility] [address] [place], Argelia,     Office Visit    5 months ago Type 2 diabetes mellitus with hyperglycemia, without long-term current use of insulin ([facility])    [facility] [place],     Office Visit    9 months ago Encounter for routine adult health examination without abnormal findings    [facility] [address], [place], Argelia,     Office Visit    1 year ago Type 2 diabetes mellitus with hyperglycemia, without long-term current use of insulin ([facility])    [facility] [address], [place], Argelia,     Office Visit    1 year ago Routine general medical examination at a health care facility    [facility] [address], [place], Argelia,     Office Visit

## 2024-08-15 DIAGNOSIS — E11.65 TYPE 2 DIABETES MELLITUS WITH HYPERGLYCEMIA, WITHOUT LONG-TERM CURRENT USE OF INSULIN (HCC): ICD-10-CM

## 2024-08-20 RX ORDER — TIRZEPATIDE 10 MG/.5ML
10 INJECTION, SOLUTION SUBCUTANEOUS WEEKLY
Qty: 2 ML | Refills: 1 | Status: SHIPPED | OUTPATIENT
Start: 2024-08-20

## 2024-08-20 NOTE — TELEPHONE ENCOUNTER
Please review. Protocol Failed; No Protocol    Requested Prescriptions   Pending Prescriptions Disp Refills    MOUNJARO 10 MG/0.5ML Subcutaneous Solution Pen-injector [Pharmacy Med Name: Mounjaro Subcutaneous Solution Pen-injector 10 MG/0.5ML] 2 mL 0     Sig: Inject 10mg into the skin once a week       Diabetes Medication Protocol Failed - 8/15/2024 11:16 AM        Failed - Last A1C < 7.5 and within past 6 months     Lab Results   Component Value Date    A1C 7.7 (A) 05/10/2024             Passed - In person appointment or virtual visit in the past 6 mos or appointment in next 3 mos     Recent Outpatient Visits              3 months ago Type 2 diabetes mellitus with hyperglycemia, without long-term current use of insulin ([facility])    [facility]    Office Visit    7 months ago Type 2 diabetes mellitus with hyperglycemia, without long-term current use of insulin ([facility])    [facility] [place],     Office Visit    11 months ago Encounter for routine adult health examination without abnormal findings    [facility] [place]    Office Visit    1 year ago Type 2 diabetes mellitus with hyperglycemia, without long-term current use of insulin ([facility])    [facility]    Office Visit    2 years ago Routine general medical examination at a health care facility    [facility] [place],     Office Visit          Future Appointments         Provider Department Appt Notes    In 1 month Argelia De Jesus DO [facility] [place]                     Passed - Microalbumin procedure in past 12 months or taking ACE/ARB        Passed - EGFRCR or GFRNAA > 50     GFR Evaluation  EGFRCR: 83 , resulted on 9/18/2023          Passed - GFR in the past 12 months                Future Appointments         Provider Department Appt Notes    In 1 month [facility]           Recent Outpatient Visits              3 months ago Type 2 diabetes mellitus with hyperglycemia, without long-term current use of insulin ([facility])    [facility] [place] Argelia De Jesus,     Office Visit    7 months ago Type 2 diabetes mellitus with hyperglycemia, without long-term current use of insulin ([facility])    [facility] [place] Argelia De Jesus,     Office Visit    11 months ago Encounter for routine adult health examination without abnormal findings    [facility] [place] [place],     Office Visit    1 year ago Type 2 diabetes mellitus with hyperglycemia, without long-term current use of insulin ([facility])    [facility] [place] Argelia De Jesus,     Office Visit    2 years ago Routine general medical examination at a health care facility    [facility] [address] [place],     Office Visit

## 2024-09-24 ENCOUNTER — TELEPHONE (OUTPATIENT)
Facility: CLINIC | Age: 54
End: 2024-09-24

## 2024-09-26 ENCOUNTER — TELEPHONE (OUTPATIENT)
Facility: CLINIC | Age: 54
End: 2024-09-26

## 2024-09-26 DIAGNOSIS — E11.65 TYPE 2 DIABETES MELLITUS WITH HYPERGLYCEMIA, WITHOUT LONG-TERM CURRENT USE OF INSULIN (HCC): ICD-10-CM

## 2024-09-26 RX ORDER — TIRZEPATIDE 10 MG/.5ML
10 INJECTION, SOLUTION SUBCUTANEOUS WEEKLY
Qty: 2 ML | Refills: 1 | Status: CANCELLED | OUTPATIENT
Start: 2024-09-26

## 2024-09-26 NOTE — TELEPHONE ENCOUNTER
Approval Details    Authorization number: [id number]  Authorized from September 25, 2024 to September 25, 2025    Patient notified via [employer].

## 2024-09-30 ENCOUNTER — LAB ENCOUNTER (OUTPATIENT)
Dept: LAB | Facility: REFERENCE LAB | Age: 54
End: 2024-09-30
Attending: FAMILY MEDICINE
Payer: COMMERCIAL

## 2024-09-30 ENCOUNTER — OFFICE VISIT (OUTPATIENT)
Facility: CLINIC | Age: 54
End: 2024-09-30
Payer: COMMERCIAL

## 2024-09-30 VITALS
SYSTOLIC BLOOD PRESSURE: 124 MMHG | HEIGHT: 69 IN | WEIGHT: 189 LBS | BODY MASS INDEX: 27.99 KG/M2 | OXYGEN SATURATION: 96 % | HEART RATE: 90 BPM | DIASTOLIC BLOOD PRESSURE: 78 MMHG

## 2024-09-30 DIAGNOSIS — Z12.5 PROSTATE CANCER SCREENING: ICD-10-CM

## 2024-09-30 DIAGNOSIS — I10 HYPERTENSION WITH GOAL BLOOD PRESSURE LESS THAN 140/90: ICD-10-CM

## 2024-09-30 DIAGNOSIS — E78.2 MIXED HYPERLIPIDEMIA: ICD-10-CM

## 2024-09-30 DIAGNOSIS — D64.9 ANEMIA, UNSPECIFIED TYPE: ICD-10-CM

## 2024-09-30 DIAGNOSIS — E11.65 TYPE 2 DIABETES MELLITUS WITH HYPERGLYCEMIA, WITHOUT LONG-TERM CURRENT USE OF INSULIN (HCC): ICD-10-CM

## 2024-09-30 DIAGNOSIS — E11.65 TYPE 2 DIABETES MELLITUS WITH HYPERGLYCEMIA, WITHOUT LONG-TERM CURRENT USE OF INSULIN (HCC): Primary | ICD-10-CM

## 2024-09-30 DIAGNOSIS — Z00.00 ROUTINE GENERAL MEDICAL EXAMINATION AT A HEALTH CARE FACILITY: ICD-10-CM

## 2024-09-30 LAB
ALBUMIN SERPL-MCNC: 4.4 G/DL (ref 3.2–4.8)
ALBUMIN/GLOB SERPL: 1.8 {RATIO} (ref 1–2)
ALP LIVER SERPL-CCNC: 44 U/L
ALT SERPL-CCNC: 12 U/L
ANION GAP SERPL CALC-SCNC: 9 MMOL/L (ref 0–18)
AST SERPL-CCNC: 15 U/L (ref ?–34)
BASOPHILS # BLD AUTO: 0.02 X10(3) UL (ref 0–0.2)
BASOPHILS NFR BLD AUTO: 0.3 %
BILIRUB SERPL-MCNC: 0.6 MG/DL (ref 0.3–1.2)
BUN BLD-MCNC: 10 MG/DL (ref 9–23)
BUN/CREAT SERPL: 9.9 (ref 10–20)
CALCIUM BLD-MCNC: 9.5 MG/DL (ref 8.7–10.4)
CHLORIDE SERPL-SCNC: 105 MMOL/L (ref 98–112)
CHOLEST SERPL-MCNC: 85 MG/DL (ref ?–200)
CO2 SERPL-SCNC: 27 MMOL/L (ref 21–32)
COMPLEXED PSA SERPL-MCNC: 0.59 NG/ML (ref ?–4)
CREAT BLD-MCNC: 1.01 MG/DL
CREAT UR-SCNC: 367.9 MG/DL
DEPRECATED RDW RBC AUTO: 47.6 FL (ref 35.1–46.3)
EGFRCR SERPLBLD CKD-EPI 2021: 89 ML/MIN/1.73M2 (ref 60–?)
EOSINOPHIL # BLD AUTO: 0.12 X10(3) UL (ref 0–0.7)
EOSINOPHIL NFR BLD AUTO: 1.7 %
ERYTHROCYTE [DISTWIDTH] IN BLOOD BY AUTOMATED COUNT: 16.4 % (ref 11–15)
EST. AVERAGE GLUCOSE BLD GHB EST-MCNC: 174 MG/DL (ref 68–126)
FASTING PATIENT LIPID ANSWER: YES
FASTING STATUS PATIENT QL REPORTED: YES
GLOBULIN PLAS-MCNC: 2.5 G/DL (ref 2–3.5)
GLUCOSE BLD-MCNC: 166 MG/DL (ref 70–99)
HBA1C MFR BLD: 7.7 % (ref ?–5.7)
HCT VFR BLD AUTO: 38.6 %
HDLC SERPL-MCNC: 32 MG/DL (ref 40–59)
HGB BLD-MCNC: 11.7 G/DL
IMM GRANULOCYTES # BLD AUTO: 0.02 X10(3) UL (ref 0–1)
IMM GRANULOCYTES NFR BLD: 0.3 %
LDLC SERPL CALC-MCNC: 37 MG/DL (ref ?–100)
LYMPHOCYTES # BLD AUTO: 1.11 X10(3) UL (ref 1–4)
LYMPHOCYTES NFR BLD AUTO: 16 %
MCH RBC QN AUTO: 24.4 PG (ref 26–34)
MCHC RBC AUTO-ENTMCNC: 30.3 G/DL (ref 31–37)
MCV RBC AUTO: 80.6 FL
MICROALBUMIN UR-MCNC: 4.1 MG/DL
MICROALBUMIN/CREAT 24H UR-RTO: 11.1 UG/MG (ref ?–30)
MONOCYTES # BLD AUTO: 0.69 X10(3) UL (ref 0.1–1)
MONOCYTES NFR BLD AUTO: 9.9 %
NEUTROPHILS # BLD AUTO: 4.98 X10 (3) UL (ref 1.5–7.7)
NEUTROPHILS # BLD AUTO: 4.98 X10(3) UL (ref 1.5–7.7)
NEUTROPHILS NFR BLD AUTO: 71.8 %
NONHDLC SERPL-MCNC: 53 MG/DL (ref ?–130)
OSMOLALITY SERPL CALC.SUM OF ELEC: 295 MOSM/KG (ref 275–295)
PLATELET # BLD AUTO: 381 10(3)UL (ref 150–450)
POTASSIUM SERPL-SCNC: 4 MMOL/L (ref 3.5–5.1)
PROT SERPL-MCNC: 6.9 G/DL (ref 5.7–8.2)
RBC # BLD AUTO: 4.79 X10(6)UL
SODIUM SERPL-SCNC: 141 MMOL/L (ref 136–145)
TRIGL SERPL-MCNC: 72 MG/DL (ref 30–149)
VLDLC SERPL CALC-MCNC: 10 MG/DL (ref 0–30)
WBC # BLD AUTO: 6.9 X10(3) UL (ref 4–11)

## 2024-09-30 PROCEDURE — 3078F DIAST BP <80 MM HG: CPT | Performed by: FAMILY MEDICINE

## 2024-09-30 PROCEDURE — 3052F HG A1C>EQUAL 8.0%<EQUAL 9.0%: CPT | Performed by: FAMILY MEDICINE

## 2024-09-30 PROCEDURE — 3008F BODY MASS INDEX DOCD: CPT | Performed by: FAMILY MEDICINE

## 2024-09-30 PROCEDURE — 80053 COMPREHEN METABOLIC PANEL: CPT | Performed by: FAMILY MEDICINE

## 2024-09-30 PROCEDURE — 80061 LIPID PANEL: CPT | Performed by: FAMILY MEDICINE

## 2024-09-30 PROCEDURE — 83540 ASSAY OF IRON: CPT | Performed by: FAMILY MEDICINE

## 2024-09-30 PROCEDURE — 82043 UR ALBUMIN QUANTITATIVE: CPT | Performed by: FAMILY MEDICINE

## 2024-09-30 PROCEDURE — 99214 OFFICE O/P EST MOD 30 MIN: CPT | Performed by: FAMILY MEDICINE

## 2024-09-30 PROCEDURE — 84466 ASSAY OF TRANSFERRIN: CPT | Performed by: FAMILY MEDICINE

## 2024-09-30 PROCEDURE — 84153 ASSAY OF PSA TOTAL: CPT | Performed by: FAMILY MEDICINE

## 2024-09-30 PROCEDURE — 82728 ASSAY OF FERRITIN: CPT | Performed by: FAMILY MEDICINE

## 2024-09-30 PROCEDURE — 83036 HEMOGLOBIN GLYCOSYLATED A1C: CPT | Performed by: FAMILY MEDICINE

## 2024-09-30 PROCEDURE — 3051F HG A1C>EQUAL 7.0%<8.0%: CPT | Performed by: FAMILY MEDICINE

## 2024-09-30 PROCEDURE — 82570 ASSAY OF URINE CREATININE: CPT | Performed by: FAMILY MEDICINE

## 2024-09-30 PROCEDURE — 3074F SYST BP LT 130 MM HG: CPT | Performed by: FAMILY MEDICINE

## 2024-09-30 PROCEDURE — 85025 COMPLETE CBC W/AUTO DIFF WBC: CPT | Performed by: FAMILY MEDICINE

## 2024-09-30 RX ORDER — METHYLPHENIDATE HYDROCHLORIDE 20 MG/1
20 TABLET ORAL 3 TIMES DAILY
COMMUNITY

## 2024-09-30 RX ORDER — METFORMIN HCL 500 MG
500 TABLET, EXTENDED RELEASE 24 HR ORAL 2 TIMES DAILY WITH MEALS
COMMUNITY
Start: 2024-09-30

## 2024-09-30 RX ORDER — METHYLPHENIDATE HYDROCHLORIDE 20 MG/1
20 TABLET ORAL 3 TIMES DAILY
COMMUNITY
Start: 2024-09-27 | End: 2024-09-30

## 2024-09-30 NOTE — PROGRESS NOTES
CC:    Chief Complaint   Patient presents with    Follow - Up     Patient said you wanted to order labs for him today and he is fasting,.       HPI: 54 year old male here to follow-up on Type 2 diabetes.  Mounjaro has been working really well for him, but had to take it one week later recently and had significant nausea and diarrhea due to this.  Now feeling better and without the side effects he had at that time.   Has also noticed fewer cravings on the medication, and no longer needs to have dessert at restaurants.   His blood sugars have been averaging about 114.   Currently taking Metformin 1,000 mg twice daily, and tolerating the two well.   Does have a decrease in the frequency of his bowel movements, and goes every 3 days on average.  Does feel he needs to drink more water.   Feels his stress levels are much better as well.   Has noticed more floaters recently, and going to see his eye doctor soon.     ROS:  General:  No fever, no fatigue, intentional weight loss, decreased appetite   HEENT:  Denies congestion or nasal discharge, floaters   Cardio:  No chest pain  Pulmonary:  No cough, no SOB  GI:  No N/V/D, constipation   :  No discharge, no dysuria, no polyuria, no hematuria  Dermatologic:  No rashes  Psych: decreased irritability   Neuro: no neuropathy     Past Medical History:    ADHD (attention deficit hyperactivity disorder)    Anxiety    Controlled and managed.    Borderline diabetes    Borderline hypertension    Depression    Controlled and managed.    Diabetes (HCC)    Essential hypertension    Hemorrhoid    High blood pressure    Hyperlipidemia    Obesity    Managed.    Shingles    Sleep apnea    Wears CPAP       Social History     Socioeconomic History    Marital status:      Spouse name: Not on file    Number of children: Not on file    Years of education: Not on file    Highest education level: Not on file   Occupational History    Not on file   Tobacco Use    Smoking status: Never     Smokeless tobacco: Never   Vaping Use    Vaping status: Never Used   Substance and Sexual Activity    Alcohol use: Yes     Alcohol/week: 1.0 standard drink of alcohol     Types: 1 Standard drinks or equivalent per week     Comment: 5 drinks per month.    Drug use: No    Sexual activity: Yes     Partners: Female   Other Topics Concern    Caffeine Concern No    Exercise Yes    Seat Belt No    Special Diet No    Stress Concern Yes     Comment: Increased from beginning of the year 2023.    Weight Concern Yes     Comment: Wait loss from [employer], but recently gaining it back.   Social History Narrative    Not on file     Social Determinants of Health     Financial Resource Strain: Not on file   Food Insecurity: Not on file   Transportation Needs: Not on file   Physical Activity: Not on file   Stress: Not on file   Social Connections: Not on file   Housing Stability: Not on file       Current Outpatient Medications   Medication Sig Dispense Refill    methylphenidate 20 MG Oral Tab Take 1 tablet (20 mg total) by mouth in the morning, at noon, and at bedtime.      Tirzepatide (MOUNJARO) 10 MG/0.5ML Subcutaneous Solution Pen-injector Inject 10 mg into the skin once a week. 2 mL 1    metoprolol succinate ER 50 MG Oral Tablet 24 Hr Take 1 tablet (50 mg total) by mouth daily. 30 tablet 11    metFORMIN  MG Oral Tablet 24 Hr Take 2 tablets (1,000 mg total) by mouth 2 (two) times daily with meals. 360 tablet 1    rosuvastatin 10 MG Oral Tab Take 1 tablet (10 mg total) by mouth nightly. 90 tablet 3    Coenzyme Q10 (COQ10) 200 MG Oral Cap Take 1 capsule by mouth daily.      diphenhydrAMINE (BENADRYL ALLERGY) 25 MG Oral Cap Take 2 capsules (50 mg total) by mouth every 6 (six) hours as needed for Itching (rash). 30 capsule 0    guanFACINE HCl ER 3 MG Oral Tablet 24 Hr Take 1 tablet (3 mg total) by mouth nightly. TAKE AT BEDTIME      Multiple Vitamins-Minerals (MULTI-VITAMIN/MINERALS) Oral Tab Take 1 tablet by mouth daily.       BusPIRone HCl 30 MG Oral Tab Take 1.5 tablets (45 mg total) by mouth 2 (two) times daily. Takes 1.5 pills twice a day  0       Bee venom      Vitals:   Vitals:    09/30/24 1043   BP: 124/78   Pulse: 90   SpO2: 96%   Weight: 189 lb (85.7 kg)   Height: 5' 9\" (1.753 m)       Body mass index is 27.91 kg/m².    Physical:  General:  Alert, appropriate, no acute distress   HEENT: supple, no lymphadenopathy   Cardio:  RRR, no murmurs, S1, S2  Pulmonary:  Clear bilaterally, good air entry  GI:  Soft, positive bowel sounds, non-tender, no masses, no guarding, no rebound  Dermatologic:  No rashes or lesions  Bilateral barefoot skin diabetic exam is normal, visualized feet and the appearance is normal.  Bilateral monofilament/sensation of both feet is normal.  Pulsation pedal pulse exam of both lower legs/feet is normal as well.      Assessment and Plan: 54-year-old male here to follow-up on type 2 diabetes and other chronic conditions.    1. Type 2 diabetes mellitus with hyperglycemia, without long-term current use of insulin (HCC)    -Doing very well on Mounjaro 10 mg weekly, and will repeat labs and urine screening today  - Will schedule annual eye exam in the next few months and send a copy of the report  -Continue Mounjaro 10 mg weekly, and will lower dose of metformin to 500 mg twice a day  - Follow-up in 6 months if A1c less than 8  - Hemoglobin A1C; Future  - Microalb/Creat Ratio, Random Urine [E]; Future    2. Hypertension with goal blood pressure less than 140/90    -Well-controlled on metoprolol with improved diet, less stress, and weight loss    3. Mixed hyperlipidemia    - Continue Rosuvastatin and repeat lipid panel  - Lipid Panel; Future    4. Routine general medical examination at a health care facility    - CBC With Differential With Platelet; Future  - Comp Metabolic Panel (14); Future    5. Prostate cancer screening    - PSA (Screening) [E]; Future      Argelia De Jesus DO  09/30/24  11:10 AM

## 2024-10-01 NOTE — TELEPHONE ENCOUNTER
Called [facility] spoke with rep Vigil.  It is approved from 9/25/2024 till 9/25/2025.  Did do a dry run for coverage and it showed it is covered.  It shows patient picked up last time at [facility] on 9/16/2024 enough for 4 weeks and is due for next  on 10/18/2024

## 2024-10-01 NOTE — TELEPHONE ENCOUNTER
Dear CMA staff,    Please verify with patient and/or pharmacy to see what exact information is needed from Dr. De Jesus and if this is an actual prior authorization.

## 2024-10-02 DIAGNOSIS — D64.9 ANEMIA, UNSPECIFIED TYPE: Primary | ICD-10-CM

## 2024-10-02 PROBLEM — D50.9 IRON DEFICIENCY ANEMIA: Status: ACTIVE | Noted: 2024-10-02

## 2024-10-02 LAB
DEPRECATED HBV CORE AB SER IA-ACNC: 12 NG/ML
IRON SATN MFR SERPL: 5 %
IRON SERPL-MCNC: 27 UG/DL
TIBC SERPL-MCNC: 524 UG/DL (ref 250–425)
TRANSFERRIN SERPL-MCNC: 352 MG/DL (ref 215–365)

## 2024-10-23 DIAGNOSIS — E11.65 TYPE 2 DIABETES MELLITUS WITH HYPERGLYCEMIA, WITHOUT LONG-TERM CURRENT USE OF INSULIN (HCC): Primary | ICD-10-CM

## 2024-10-23 RX ORDER — TIRZEPATIDE 10 MG/.5ML
10 INJECTION, SOLUTION SUBCUTANEOUS WEEKLY
Qty: 2 ML | Refills: 1 | Status: CANCELLED | OUTPATIENT
Start: 2024-10-23 | End: 2024-11-14

## 2024-10-23 NOTE — TELEPHONE ENCOUNTER
Patient calling,verified name and date of birth.  Requests Mounjaro refill. Pharmacy verified as [facility].  Medication  pended to run through protocol.

## 2024-10-25 RX ORDER — TIRZEPATIDE 10 MG/.5ML
10 INJECTION, SOLUTION SUBCUTANEOUS WEEKLY
Qty: 6 ML | Refills: 1 | OUTPATIENT
Start: 2024-10-25

## 2024-12-18 DIAGNOSIS — E11.65 TYPE 2 DIABETES MELLITUS WITH HYPERGLYCEMIA, WITHOUT LONG-TERM CURRENT USE OF INSULIN (HCC): ICD-10-CM

## 2024-12-21 DIAGNOSIS — E11.65 TYPE 2 DIABETES MELLITUS WITH HYPERGLYCEMIA, WITHOUT LONG-TERM CURRENT USE OF INSULIN (HCC): ICD-10-CM

## 2024-12-24 RX ORDER — TIRZEPATIDE 12.5 MG/.5ML
12.5 INJECTION, SOLUTION SUBCUTANEOUS WEEKLY
Qty: 2 ML | Refills: 0 | Status: SHIPPED | OUTPATIENT
Start: 2024-12-24

## 2024-12-24 NOTE — TELEPHONE ENCOUNTER
Please review. Protocol failed/No protocol      Requested Prescriptions   Pending Prescriptions Disp Refills    Tirzepatide (MOUNJARO) 12.5 MG/0.5ML Subcutaneous Solution Auto-injector 2 mL 0     Sig: Inject 12.5 mg into the skin once a week.       Diabetes Medication Protocol Failed - 12/24/2024  7:54 AM        Failed - Last A1C < 7.5 and within past 6 months     Lab Results   Component Value Date    A1C 7.7 (H) 09/30/2024             Passed - In person appointment or virtual visit in the past 6 mos or appointment in next 3 mos     Recent Outpatient Visits              2 months ago Type 2 diabetes mellitus with hyperglycemia, without long-term current use of insulin ([facility])    [facility] Argelia De Jesus,     Office Visit    7 months ago Type 2 diabetes mellitus with hyperglycemia, without long-term current use of insulin ([facility])    [facility] Argelia De Jesus,     Office Visit    11 months ago Type 2 diabetes mellitus with hyperglycemia, without long-term current use of insulin ([facility])    [facility] Argelia De Jesus,     Office Visit    1 year ago Encounter for routine adult health examination without abnormal findings    [facility] Argelia De Jesus, DO    Office Visit    1 year ago Type 2 diabetes mellitus with hyperglycemia, without long-term current use of insulin ([facility])    [facility] Argelia De Jesus, DO    Office Visit                      Passed - Microalbumin procedure in past 12 months or taking ACE/ARB        Passed - EGFRCR or GFRNAA > 50     GFR Evaluation  EGFRCR: 89 , resulted on 9/30/2024          Passed - GFR in the past 12 months             Recent Outpatient Visits              2 months ago Type 2 diabetes mellitus with hyperglycemia, without long-term current use of insulin ([facility])    [facility] [place],     Office Visit    7 months ago Type 2 diabetes mellitus with hyperglycemia, without long-term current use of insulin (HCC)    [facility] [place],     Office Visit    11 months ago Type 2 diabetes mellitus with hyperglycemia, without long-term current use of insulin (HCC)    [facility]     [facility]    1 year ago Encounter for routine adult health examination without abnormal findings    [facility] [address], [place], Ar[place],     Office Visit    1 year ago Type 2 diabetes mellitus with hyperglycemia, without long-term current use of insulin (HCC)    [facility]     [facility] Visit

## 2024-12-27 RX ORDER — TIRZEPATIDE 12.5 MG/.5ML
1 INJECTION, SOLUTION SUBCUTANEOUS
Qty: 2 ML | Refills: 0 | OUTPATIENT
Start: 2024-12-27

## 2025-01-20 ENCOUNTER — TELEPHONE (OUTPATIENT)
Facility: CLINIC | Age: 55
End: 2025-01-20

## 2025-01-20 DIAGNOSIS — E11.65 TYPE 2 DIABETES MELLITUS WITH HYPERGLYCEMIA, WITHOUT LONG-TERM CURRENT USE OF INSULIN (HCC): ICD-10-CM

## 2025-01-28 RX ORDER — TIRZEPATIDE 12.5 MG/.5ML
12.5 INJECTION, SOLUTION SUBCUTANEOUS WEEKLY
Qty: 6 ML | Refills: 1 | Status: SHIPPED | OUTPATIENT
Start: 2025-01-28

## 2025-01-28 NOTE — TELEPHONE ENCOUNTER
No future appointments     I left detailed message on verbal release verified and identified voicemail # [phone] Patient contacted and made aware of Dr. De Jesus's note below and Rx sent; also making aware of [facility] message being sent [1st attempt]    Patient  - please assist with scheduling advised visit

## 2025-01-28 NOTE — TELEPHONE ENCOUNTER
Spoke to patient and advised him of Dr. De Jesus's notes  He will schedule an appointment on [facility].    Patient states he has been taking Metformin 1000mg twice a day. The last time the dose was increased he got terribly ill with diarrhea. Patient states the Mounjaro was adjusted    Patient states his blood glucose readings have been between 130-140 fasting in the morning.

## 2025-01-28 NOTE — TELEPHONE ENCOUNTER
Outgoing call to patient and patient is schedule for 05/09/2025.   No action needed at this time .

## 2025-01-28 NOTE — TELEPHONE ENCOUNTER
He needs to make a follow-up visit in the next few months, and the dose of Metformin depends on how his blood sugar readings have been.

## 2025-01-28 NOTE — TELEPHONE ENCOUNTER
SENT AS HIGH PRIORITY ...  DR De Jesus =please clarify if patient should be on Metformin 500 mg twice a day or 1000 mg twice a day ? See visit notes below, thanks.     Patient called back, verified name and date of birth.   States that he is NOT requesting for an increase dose but only refill for his Mounjaro 12.5 mg ,preferred pharmacy is [facility].   He's been taking the Mounjaro 12.5 mg weekly for the past 2 months now. And he already missed one dose.   He is also taking the metformin 1000 mg twice a day .     COPIED AND PASTE 10/7/24 [facility] note;  October 23, 2024  Argelia De Jesus DO to Davin Mercado         10/23/24  6:42 PM  No problemDavin.  I just sent in refills of Mounjaro 10 mg weekly to your [facility] in addition to a higher dosage of metformin.  I wrote the metformin for 1000 mg twice a day, but I would start by taking 500 mg with breakfast and 1000 mg with dinner to ensure you tolerate this dose okay.  Always take it with food to minimize or avoid any diarrhea or other GI side effects.  Please let me know if there are any issues or questions going forward.     Dr. De Jesus      COPIED AND PASTE 11/18/24 [facility] note;  Argelia De Jesus DO to Davin Mercado         11/19/24  2:49 PM  Meet Jin,     I would recommend increasing the Mounjaro to 12.5 mg weekly and lowering the dose of metformin to 500 mg twice daily.  How does that sound to you?

## 2025-01-28 NOTE — TELEPHONE ENCOUNTER
1st phone attempt:    - Called patient and received their voicemail.    Patient's voicemail had patient identifying information, so detail message was left.    **Please advise that patient is prescribed Mounjaro for Type 2 Diabetes, not weight loss. Patient dosing should no be changed without provider approval (I.e HgA1c level, glucose reading, and side effects)**    GLP-1 informational questions were sent to patient to see if they are ready for a dose increase.

## 2025-01-28 NOTE — TELEPHONE ENCOUNTER
Please kindly review; protocol failed or medication has no protocol attached.     Recent Visits  Date Type Provider Dept   09/30/24 Office Visit Ar[facility]ia De Jesus DO [facility]   05/10/24 Office Visit [facility] De Jesus DO [facility]     Future Appointments  None     Requested Prescriptions   Pending Prescriptions Disp Refills    Tirzepatide (MOUNJARO) 12.5 MG/0.5ML Subcutaneous Solution Auto-injector 2 mL 0     Sig: Inject 12.5 mg into the skin once a week.       Diabetes Medication Protocol Failed - 1/28/2025  9:29 AM        Failed - Last A1C < 7.5 and within past 6 months     Lab Results   Component Value Date    A1C 7.7 (H) 09/30/2024             Passed - In person appointment or virtual visit in the past 6 mos or appointment in next 3 mos     Recent Outpatient Visits              4 months ago Type 2 diabetes mellitus with hyperglycemia, without long-term current use of insulin ([facility])    [facility] Argelia De Jesus,     Office Visit    8 months ago Type 2 diabetes mellitus with hyperglycemia, without long-term current use of insulin ([facility])    [facility] Ar[place]ia De Jesus,     Office Visit    1 year ago Type 2 diabetes mellitus with hyperglycemia, without long-term current use of insulin ([facility])    [facility] Ar[place]ia De Jesus,     Office Visit    1 year ago Encounter for routine adult health examination without abnormal findings    [facility] Argelia De Jesus,     Office Visit    1 year ago Type 2 diabetes mellitus with hyperglycemia, without long-term current use of insulin ([facility])    [facility] Argelia De Jesus,     Office Visit                      Passed - Microalbumin procedure in past 12 months or taking ACE/ARB        Passed - EGFRCR or GFRNAA > 50     GFR Evaluation  EGFRCR: 89 , resulted on 9/30/2024          Passed -  GFR in the past 12 months        Passed - Medication is active on med list               Recent Outpatient Visits              4 months ago Type 2 diabetes mellitus with hyperglycemia, without long-term current use of insulin [facility] [place] [facility] [place]     Office Visit    8 months ago Type 2 diabetes mellitus with hyperglycemia, without long-term current use of insulin ([facility])    [facility]     [facility] Visit    1 year ago Type 2 diabetes mellitus with hyperglycemia, without long-term current use of insulin ([facility])    [facility] [place],     Office Visit    1 year ago Encounter for routine adult health examination without abnormal findings    [facility] [place] [facility] [place]     Office Visit    1 year ago Type 2 diabetes mellitus with hyperglycemia, without long-term current use of insulin ([facility])    [facility] [place],     Office Visit

## 2025-01-28 NOTE — TELEPHONE ENCOUNTER
Hemoglobin A1C   Component  Ref Range & Units 9/30/24 12:07 PM   HgbA1C  <5.7 % 7.7 High      Per Dr. De Jesus's lab result note 9/30/24:  Your hemoglobin A1C stayed the same at 7.7, which was surprising based on how well you are doing on the Mounjaro. We could consider increasing the dose of Mounjaro to 12.5 mg weekly, or you could go back up on the Metformin to 1,000 mg twice a day.     Per \"patient message\" encounter 11/18/24:  About 4 weeks ago I slowly increased the Metformin.  Then I got sick.  At first I thought I had food poisoning, but when I got back to increasing the Metformin I became sick again.  I had both nausea, vomiting, and diarrhea for a few days.  I went back to the normal Metformin dosage last week.  So would increasing my Mounjaro be the next option?     Per patient:  About 4 weeks ago I slowly increased the Metformin.  Then I got sick.  At first I thought I had food poisoning, but when I got back to increasing the Metformin I became sick again.  I had both nausea, vomiting, and diarrhea for a few days.  I went back to the normal Metformin dosage last week.  So would increasing my Mounjaro be the next option?

## 2025-02-11 RX ORDER — ROSUVASTATIN CALCIUM 10 MG/1
10 TABLET, COATED ORAL NIGHTLY
Qty: 90 TABLET | Refills: 3 | Status: SHIPPED | OUTPATIENT
Start: 2025-02-11

## 2025-02-11 NOTE — TELEPHONE ENCOUNTER
REFILL PASSED PER [facility] PROTOCOLS    Requested Prescriptions   Pending Prescriptions Disp Refills    ROSUVASTATIN 10 MG Oral Tab [Pharmacy Med Name: Rosuvastatin Calcium Oral Tablet 10 MG] 90 tablet 0     Sig: TAKE 1 TABLET BY MOUTH  NIGHTLY       Cholesterol Medication Protocol Passed - 2/11/2025  3:17 PM        Passed - ALT < 80     Lab Results   Component Value Date    ALT 12 09/30/2024             Passed - ALT resulted within past year        Passed - Lipid panel within past 12 months     Lab Results   Component Value Date    CHOLEST 85 09/30/2024    TRIG 72 09/30/2024    HDL 32 (L) 09/30/2024    LDL 37 09/30/2024    VLDL 10 09/30/2024    NONHDLC 53 09/30/2024             Passed - In person appointment or virtual visit in the past 12 mos or appointment in next 3 mos     Recent Outpatient Visits              4 months ago Type 2 diabetes mellitus with hyperglycemia, without long-term current use of insulin ([facility])    [facility] Argelia De Jesus DO    Office Visit    9 months ago Type 2 diabetes mellitus with hyperglycemia, without long-term current use of insulin ([facility])    [facility] Ar[place] De Jesus DO    Office Visit    1 year ago Type 2 diabetes mellitus with hyperglycemia, without long-term current use of insulin ([facility])    [facility] [place] De Jesus DO    Office Visit    1 year ago Encounter for routine adult health examination without abnormal findings    [facility] Ar[place]ia De Jesus DO    Office Visit    1 year ago Type 2 diabetes mellitus with hyperglycemia, without long-term current use of insulin ([facility])    [facility] Ar[place] De Jesus DO    Office Visit          Future Appointments         Provider Department Appt Notes    In 2 months Ar[place]ia De Jesus DO [facility]                      Passed - Medication is active on med list             Future Appointments         Provider Department Appt Notes    In 2 months Argelia De Jesus DO [facility]           Recent Outpatient Visits              4 months ago Type 2 diabetes mellitus with hyperglycemia, without long-term current use of insulin ([facility])    [facility] [place] De Jesus,     Office Visit    9 months ago Type 2 diabetes mellitus with hyperglycemia, without long-term current use of insulin ([facility])    [facility] [place] De Jesus,     Office Visit    1 year ago Type 2 diabetes mellitus with hyperglycemia, without long-term current use of insulin (HCC)    [facility] [place] De Jesus,     Office Visit    1 year ago Encounter for routine adult health examination without abnormal findings    [facility],     Office Visit    1 year ago Type 2 diabetes mellitus with hyperglycemia, without long-term current use of insulin (HCC)    [facility] [place] De Jesus,     Office Visit

## 2025-04-22 RX ORDER — METFORMIN HYDROCHLORIDE 500 MG/1
500 TABLET, EXTENDED RELEASE ORAL 2 TIMES DAILY WITH MEALS
Qty: 180 TABLET | Refills: 0 | Status: SHIPPED | OUTPATIENT
Start: 2025-04-22

## 2025-04-22 NOTE — TELEPHONE ENCOUNTER
Please review; protocol failed/ has no protocol      No active /future labs noted   Please see message below for upcoming appointment.    Future Appointments   Date Time Provider Department Center   5/9/2025 10:30 AM Argelia De Jesus DO [facility]

## 2025-05-09 ENCOUNTER — OFFICE VISIT (OUTPATIENT)
Facility: CLINIC | Age: 55
End: 2025-05-09
Payer: COMMERCIAL

## 2025-05-09 VITALS
DIASTOLIC BLOOD PRESSURE: 74 MMHG | WEIGHT: 193 LBS | HEART RATE: 76 BPM | SYSTOLIC BLOOD PRESSURE: 118 MMHG | BODY MASS INDEX: 28.58 KG/M2 | OXYGEN SATURATION: 99 % | HEIGHT: 69 IN

## 2025-05-09 DIAGNOSIS — R35.1 NOCTURIA: ICD-10-CM

## 2025-05-09 DIAGNOSIS — E11.65 TYPE 2 DIABETES MELLITUS WITH HYPERGLYCEMIA, WITHOUT LONG-TERM CURRENT USE OF INSULIN (HCC): Primary | ICD-10-CM

## 2025-05-09 LAB — HEMOGLOBIN A1C: 6.8 % (ref 4.3–5.6)

## 2025-05-09 PROCEDURE — 99214 OFFICE O/P EST MOD 30 MIN: CPT | Performed by: FAMILY MEDICINE

## 2025-05-09 PROCEDURE — 3008F BODY MASS INDEX DOCD: CPT | Performed by: FAMILY MEDICINE

## 2025-05-09 PROCEDURE — 3074F SYST BP LT 130 MM HG: CPT | Performed by: FAMILY MEDICINE

## 2025-05-09 PROCEDURE — 3078F DIAST BP <80 MM HG: CPT | Performed by: FAMILY MEDICINE

## 2025-05-09 PROCEDURE — 83036 HEMOGLOBIN GLYCOSYLATED A1C: CPT | Performed by: FAMILY MEDICINE

## 2025-05-09 RX ORDER — METHYLPHENIDATE HYDROCHLORIDE 54 MG/1
54 TABLET ORAL EVERY MORNING
COMMUNITY

## 2025-05-09 RX ORDER — TRAZODONE HYDROCHLORIDE 50 MG/1
100 TABLET ORAL NIGHTLY PRN
COMMUNITY
Start: 2025-04-09

## 2025-05-09 NOTE — PROGRESS NOTES
CC:    Chief Complaint   Patient presents with    Diabetes     Follow up       HPI: 55 year old male here to follow-up on Type 2 diabetes.  Has been on Mounjaro 12.5 mg weekly since the end of January, and has been doing well on that dose without any significant side effects.  He takes a fiber supplement 2-3 days a week, and that helps with his bowel movements.   Takes Metformin 500 mg twice daily with the Mounjaro as well.  Fasting blood sugars are around 120 on average.   His cravings are mostly gone on the Mounjaro, and he has lost significant weight since being on the medication.   He can only eat 1/4 of his plate now as well.  Has a light breakfast, skips lunch, and a larger meal at dinner. Will have a snack later at dinner at times.   Needs to drink more water.   He is not sleeping well. He is waking up around 2-3am most nights and has to urinate, and then he is up for a few hours.  He was started on Trazodone to help with this.   He was having a lot of job stress, but it is better now.    Will only urinate once during the day, but overnight he has to urinate.   Had his diabetic eye exam in November of 2024 and everything came back okay.     ROS:  General:  No fever, no fatigue, weight loss, decreased appetite  HEENT:  Denies congestion or nasal discharge, no blurry vision   Cardio:  No chest pain  Pulmonary:  No cough, no SOB  GI:  No N/V/D  :  No discharge, no dysuria, no polyuria, no hematuria, nocturia but normal urinary stream   Dermatologic:  No rashes    Past Medical History[1]    Social History     Socioeconomic History    Marital status:      Spouse name: Not on file    Number of children: Not on file    Years of education: Not on file    Highest education level: Not on file   Occupational History    Not on file   Tobacco Use    Smoking status: Never    Smokeless tobacco: Never   Vaping Use    Vaping status: Never Used   Substance and Sexual Activity    Alcohol use: Yes     Alcohol/week: 1.0  standard drink of alcohol     Types: 1 Standard drinks or equivalent per week     Comment: 5 drinks per month.    Drug use: No    Sexual activity: Yes     Partners: Female   Other Topics Concern    Caffeine Concern No    Exercise No    Seat Belt No    Special Diet No    Stress Concern Yes     Comment: Sleeping    Weight Concern No   Social History Narrative    Not on file     Social Drivers of Health     Food Insecurity: No Food Insecurity (5/9/2025)    NCSS - Food Insecurity     Worried About Running Out of Food in the Last Year: No     Ran Out of Food in the Last Year: No   Transportation Needs: No Transportation Needs (5/9/2025)    NCSS - Transportation     Lack of Transportation: No   Stress: Not on file   Housing Stability: Not At Risk (5/9/2025)    NCSS - Housing/Utilities     Has Housing: Yes     Worried About Losing Housing: No     Unable to Get Utilities: No       Current Medications[2]    Bee venom      Vitals:   Vitals:    05/09/25 1030   BP: 118/74   Pulse: 76   SpO2: 99%   Weight: 193 lb (87.5 kg)   Height: 5' 9\" (1.753 m)     Wt Readings from Last 6 Encounters:   05/09/25 193 lb (87.5 kg)   09/30/24 189 lb (85.7 kg)   05/10/24 206 lb (93.4 kg)   01/22/24 212 lb (96.2 kg)   09/18/23 214 lb (97.1 kg)   05/15/23 219 lb (99.3 kg)       Body mass index is 28.5 kg/m².    Physical:  General:  Alert, appropriate, no acute distress   Psych: normal mood and affect    Recent Results (from the past 24 hours)   POC Hemoglobin A1C    Collection Time: 05/09/25 10:44 AM   Result Value Ref Range    HEMOGLOBIN A1C 6.8 (A) 4.3 - 5.6 %    Cartridge Lot# [id number] Numeric    Cartridge Expiration Date 12/5/26 Date         Assessment and Plan: 55 year old male here to follow-up on Type 2 diabetes.    1. Type 2 diabetes mellitus with hyperglycemia, without long-term current use of insulin (HCC)    - A1C improved to 6.8 with higher dose of Mounjaro 12.5 mg weekly, and tolerating it well  - Continue at this same dose and will  also continue Metformin 500 mg twice daily  - Discussed increasing protein during the day to prevent later night snacking which is likely raising his blood sugars overnight  - Consider increasing Mounjaro to 15 mg weekly at follow-up with goal of coming off of Metformin  - Called [facility] to get results of last fall's diabetic eye exam   - POC Hemoglobin A1C    2. Nocturia    - Will try to increase water intake and decrease caffeine to see if it helps  - Follow-up at physical with blood work, including PSA, and if still persisting plan referral to urology         Argelia De Jesus DO  05/09/25  10:42 AM         [1]   Past Medical History:   ADHD (attention deficit hyperactivity disorder)    Anxiety    Controlled and managed.    Borderline diabetes    Borderline hypertension    Colon polyp    Depression    Controlled and managed.    Diabetes (HCC)    Essential hypertension    Hemorrhoid    Hemorrhoids    High blood pressure    Hyperlipidemia    Obesity    Managed.    Shingles    Sleep apnea    Wears CPAP   [2]   Current Outpatient Medications   Medication Sig Dispense Refill    methylphenidate ER 54 MG Oral Tab CR Take 1 tablet (54 mg total) by mouth every morning.      traZODone 50 MG Oral Tab Take 1 tablet (50 mg total) by mouth nightly as needed.      metFORMIN  MG Oral Tablet 24 Hr Take 1 tablet (500 mg total) by mouth 2 (two) times daily with meals. 180 tablet 0    rosuvastatin 10 MG Oral Tab Take 1 tablet (10 mg total) by mouth nightly. 90 tablet 3    Tirzepatide (MOUNJARO) 12.5 MG/0.5ML Subcutaneous Solution Auto-injector Inject 12.5 mg into the skin once a week. 6 mL 1    metoprolol succinate ER 50 MG Oral Tablet 24 Hr Take 1 tablet (50 mg total) by mouth daily. 30 tablet 11    diphenhydrAMINE (BENADRYL ALLERGY) 25 MG Oral Cap Take 2 capsules (50 mg total) by mouth every 6 (six) hours as needed for Itching (rash). 30 capsule 0    guanFACINE HCl ER 3 MG Oral Tablet 24 Hr Take 1 tablet (3 mg total) by mouth  nightly. TAKE AT BEDTIME      Multiple Vitamins-Minerals (MULTI-VITAMIN/MINERALS) Oral Tab Take 1 tablet by mouth daily.      BusPIRone HCl 30 MG Oral Tab Take 1.5 tablets (45 mg total) by mouth 2 (two) times daily. Takes 1.5 pills twice a day  0

## 2025-06-26 RX ORDER — METFORMIN HYDROCHLORIDE 500 MG/1
500 TABLET, EXTENDED RELEASE ORAL 2 TIMES DAILY WITH MEALS
Qty: 180 TABLET | Refills: 3 | Status: SHIPPED | OUTPATIENT
Start: 2025-06-26

## 2025-06-26 NOTE — TELEPHONE ENCOUNTER
Refill Per Protocol     Requested Prescriptions   Pending Prescriptions Disp Refills    metFORMIN  MG Oral Tablet 24 Hr 180 tablet 0     Sig: Take 1 tablet (500 mg total) by mouth 2 (two) times daily with meals.       Diabetes Medication Protocol Passed - 6/26/2025  9:27 AM        Passed - Last A1C < 7.5 and within past 6 months     Lab Results   Component Value Date    A1C 6.8 (A) 05/09/2025             Passed - In person appointment or virtual visit in the past 6 mos or appointment in next 3 mos     Recent Outpatient Visits              1 month ago Type 2 diabetes mellitus with hyperglycemia, without long-term current use of insulin ([facility])    [facility] Argelia De Jesus DO    Office Visit    8 months ago Type 2 diabetes mellitus with hyperglycemia, without long-term current use of insulin ([facility])    [facility] Argelia De Jesus DO    Office Visit    1 year ago Type 2 diabetes mellitus with hyperglycemia, without long-term current use of insulin ([facility])    [facility] Argelia De Jesus DO    Office Visit    1 year ago Type 2 diabetes mellitus with hyperglycemia, without long-term current use of insulin ([facility])    [facility] Argelia De Jesus,     Office Visit    1 year ago Encounter for routine adult health examination without abnormal findings    [facility] Argelia De Jesus,     Office Visit          Future Appointments         Provider Department Appt Notes    In 3 months Argelia De Jesus DO [facility] Urinating at night / Sleep health                    Passed - Microalbumin procedure in past 12 months or taking ACE/ARB        Passed - EGFRCR or GFRNAA > 50     GFR Evaluation  EGFRCR: 89 , resulted on 9/30/2024          Passed - GFR in the past 12 months        Passed - Medication is  active on med list

## 2025-07-18 DIAGNOSIS — E11.65 TYPE 2 DIABETES MELLITUS WITH HYPERGLYCEMIA, WITHOUT LONG-TERM CURRENT USE OF INSULIN (HCC): ICD-10-CM

## 2025-07-22 RX ORDER — TIRZEPATIDE 12.5 MG/.5ML
12.5 INJECTION, SOLUTION SUBCUTANEOUS WEEKLY
Qty: 6 ML | Refills: 1 | Status: SHIPPED | OUTPATIENT
Start: 2025-07-22

## 2025-08-18 RX ORDER — METOPROLOL SUCCINATE 50 MG/1
50 TABLET, EXTENDED RELEASE ORAL DAILY
Qty: 30 TABLET | Refills: 11 | Status: SHIPPED | OUTPATIENT
Start: 2025-08-18

## (undated) DEVICE — LINE MNTR ADLT SET O2 INTMD

## (undated) DEVICE — KIT CLEAN ENDOKIT 1.1OZ GOWNX2

## (undated) DEVICE — FORCEP RADIAL JAW 4

## (undated) DEVICE — KIT ENDO ORCAPOD 160/180/190

## (undated) DEVICE — SNARE OPTMZ PLPCTM TRP

## (undated) DEVICE — SYRINGE MNJCT 35ML LF STRL LL

## (undated) DEVICE — SNARE ENDOSCOPIC 10MM ROUND

## (undated) NOTE — ED AVS SNAPSHOT
Laura Araujo   MRN: [id number]    Department:  [facility] Emergency Department   Date of Visit:  12/12/2017           Disclosure     Insurance plans vary and the physician(s) referred by the ER may not be covered by your plan.  Please contact CARE PHYSICIAN AT ONCE OR RETURN IMMEDIATELY TO THE EMERGENCY DEPARTMENT. If you have been prescribed any medication(s), please fill your prescription right away and begin taking the medication(s) as directed.   If you believe that any of the medications

## (undated) NOTE — MR AVS SNAPSHOT
[facility]  [address] [place] [address], [address] [facility]  [id number] [place] [address] [id number] [phone]               Thank you for choosing us for your health care visit with Genesis Huggins MD.  We are glad to serve you and happy to provide you with this summary of y may be held responsible for payment in full if proper authorization is not acquired. Please contact the Patient Business Office at [phone] if you have any questions related to insurance coverage. Thank you.        Saturday April 29, 2017     Imaging: TAKE ONE CAPSULE BY MOUTH IN THE MORNING   What changed:  Another medication with the same name was removed. Continue taking this medication, and follow the directions you see here.            MetFORMIN HCl  MG Tb24   Take 1 tablet (500 mg total) by m walking, light jogging, cycling, swimming, etc.) for a goal of at least 150 minutes per week. Moderation of alcohol consumption Men: limit to <= 2 drinks* per day. Women and lighter weight persons: limit to <= 1 drink* per day.                       [facility]

## (undated) NOTE — LETTER
[address], [place]  Authorization for Invasive Procedures  1. I hereby authorize Dr. Michelle Aldrich , my physician and whomever may be designated as the doctor's assistant, to perform the following operation and/or procedure:  Colonoscopy on [address] [facility] [address] at [facility].    2. My physician has explained to me the nature and purpose of the operation or other procedure, possible alternative methods of treatment, the risks involved and the possibility of complications to me. I understand the probable consequences of declining the recommended procedure and the alternative methods of treatment. I acknowledge that no guarantee has been made as to the result that may be obtained. 3. I recognize that during the course of this operation or other procedure, unforeseen conditions may necessitate additional or different procedures than those listed above. I, therefore, further authorize and request that the above-named physician, his/her physician assistants, or designees perform such procedures as are, in his/her professional opinion, necessary and desirable. If I have a Do Not Attempt Resuscitation (DNAR) order in place, that status will be suspended while in the operating room, procedural suite, and during the recovery period unless otherwise explicitly stated by me (or a person authorized to consent on my behalf). The surgeon or my attending physician will determine when the applicable recovery period ends for purposes of reinstating the DNAR order. 4. Should the need arise during my operation or immediate post-operative period; I also consent to the administration of blood and/or blood products.  Further, I understand that despite careful testing and screening of blood and blood products, I may still be subject to ill effects as a result of recieving a blood transfusion an/or blood producst. The following are some, but not all, of the potential risks that can occur: fever and allergic reactions, hemolytic reactions, transmission of disease such as hepatitis, AIDS, cytomegalovirus (CMV), and flluid overload. In the event that I wish to have autologous transfusions of my own blood, or a directed donor transfusion, I will discuss this with my physician. 5. I consent to the photographing of the operations or procedures to be performed for the purposes of advancing medicine, science, and/or education, provided my identity is not revealed. If the procedure has been videotaped, the physician/surgeon will obtain the original videotape. The hospital will not be responsible for storage or maintenance of this tape. 6. I consent to the presence of a  or observer as deemed necessary by my physician or his designee. 7. Any tissues or organs removed in the operation or other procedure may be disposed of by and at the discretion of [facility].    8. I understand that the physician and his/her physician assistants may not be employees or agents of [facility], [facility], [facility] hospital, but are independent medical practitioners who have been permitted to use its facilities for the care and treatment of their patients. 9. Patients having a sterilization procedure: I understand that if the procedure is successful the results will be permanent and it will therefore be impossible for me to inseminate, conceive or bear children. I also understand that the procedure is intended to result in sterility, although the result has not been guaranteed. 10. I CERTIFY THAT I HAVE READ AND FULLY UNDERSTAND THE ABOVE CONSENT TO OPERATION and/or OTHER PROCEDURE. 11. I acknowledge that my physician has explained sedation/analgesia administration to me including the risks and benefits. I consent to the administration of sedation/analgesia as may be necessary or desirable in the judgment of my physician.      Signature of Patient:  ________________________________________________ Date: _________Time: _________    Responsible person in case of minor or unconscious: _____________________________Relationship: ____________     Witness Signature: ____________________________________________ Date: __________ Time: ___________    Statement of Physician  My signature below affirms that prior to the time of the procedure, I have explained to the patient and/or his legal representative, the risks and benefits involved in the proposed treatment and any reasonable alternative to the proposed treatment. I have also explained the risks and benefits involved in the refusal of the proposed treatment and have answered the patient's questions. If I have a significant financial interest in this procedure/surgery, I have disclosed this and had a discussion with my patient.     Signature of Physician:   ________________________________________Date: _________Time:_______ Patient Name: Corinne Neves  : 4/15/1970   Printed: 2022    Medical Record #: [id number]